# Patient Record
Sex: MALE | Race: WHITE | Employment: FULL TIME | URBAN - METROPOLITAN AREA
[De-identification: names, ages, dates, MRNs, and addresses within clinical notes are randomized per-mention and may not be internally consistent; named-entity substitution may affect disease eponyms.]

---

## 2017-07-25 ENCOUNTER — APPOINTMENT (OUTPATIENT)
Dept: GENERAL RADIOLOGY | Age: 57
End: 2017-07-25
Attending: EMERGENCY MEDICINE
Payer: MEDICAID

## 2017-07-25 ENCOUNTER — HOSPITAL ENCOUNTER (EMERGENCY)
Age: 57
Discharge: HOME | End: 2017-07-25
Attending: EMERGENCY MEDICINE
Payer: MEDICAID

## 2017-07-25 VITALS
WEIGHT: 165 LBS | TEMPERATURE: 97.6 F | SYSTOLIC BLOOD PRESSURE: 130 MMHG | DIASTOLIC BLOOD PRESSURE: 85 MMHG | HEIGHT: 68 IN | BODY MASS INDEX: 25.01 KG/M2 | RESPIRATION RATE: 18 BRPM | OXYGEN SATURATION: 96 % | HEART RATE: 69 BPM

## 2017-07-25 DIAGNOSIS — M79.672 INFLAMMATORY HEEL PAIN, LEFT: Primary | ICD-10-CM

## 2017-07-25 DIAGNOSIS — M72.2 PLANTAR FASCIITIS: ICD-10-CM

## 2017-07-25 PROCEDURE — 73630 X-RAY EXAM OF FOOT: CPT

## 2017-07-25 PROCEDURE — 99282 EMERGENCY DEPT VISIT SF MDM: CPT

## 2017-07-25 RX ORDER — ACETAMINOPHEN AND CODEINE PHOSPHATE 300; 30 MG/1; MG/1
1 TABLET ORAL
Qty: 15 TAB | Refills: 0 | Status: SHIPPED | OUTPATIENT
Start: 2017-07-25 | End: 2018-06-20

## 2017-07-25 RX ORDER — NAPROXEN 500 MG/1
500 TABLET ORAL 2 TIMES DAILY WITH MEALS
Qty: 20 TAB | Refills: 0 | Status: SHIPPED | OUTPATIENT
Start: 2017-07-25 | End: 2017-08-04

## 2017-07-25 NOTE — ED TRIAGE NOTES
Patient to ED with c/o left heal pain intermittently. Patient ambulatory on arrival. Rates pain 5/10. NKDA.

## 2017-07-25 NOTE — ED PROVIDER NOTES
HPI Comments: Dewayne Dias is a 56 y.o. Male who presents to the ED with c/o intermittent left foot pain for several months, worse today. He states his symptoms are worse with sitting down and wake him up from his sleep. Reports pain with ambulation. Reports a normal appetite. Claims his symptoms are improved with changing pair of shoes. Admits he has not visited a PCP because he does not have insurance. Admits to smoking and ETOH use. No other symptoms or concerns were expressed. The history is provided by the patient and a relative. No past medical history on file. No past surgical history on file. No family history on file. Social History     Social History    Marital status:      Spouse name: N/A    Number of children: N/A    Years of education: N/A     Occupational History    Not on file. Social History Main Topics    Smoking status: Current Every Day Smoker     Packs/day: 1.00    Smokeless tobacco: Not on file    Alcohol use Yes      Comment: Ocassionally    Drug use: No    Sexual activity: Yes     Partners: Female     Other Topics Concern    Not on file     Social History Narrative         ALLERGIES: Review of patient's allergies indicates no known allergies. Review of Systems   Constitutional: Negative. Negative for appetite change. HENT: Negative. Eyes: Negative. Respiratory: Negative. Cardiovascular: Negative. Gastrointestinal: Negative. Endocrine: Negative. Genitourinary: Negative. Musculoskeletal: Positive for myalgias. See HPI   Skin: Negative. Allergic/Immunologic: Negative. Neurological: Negative. Hematological: Negative. Psychiatric/Behavioral: Negative. All other systems reviewed and are negative.       Vitals:    07/25/17 1901   BP: 130/85   Pulse: 69   Resp: 18   Temp: 97.6 °F (36.4 °C)   SpO2: 96%   Weight: 74.8 kg (165 lb)   Height: 5' 8\" (1.727 m)            Physical Exam   Constitutional: He is oriented to person, place, and time. He appears well-developed and well-nourished. No distress. HENT:   Head: Normocephalic and atraumatic. Eyes: Conjunctivae and EOM are normal. Pupils are equal, round, and reactive to light. No scleral icterus. Neck: Normal range of motion. Neck supple. No JVD present. No thyromegaly present. Cardiovascular: Normal rate, regular rhythm, S1 normal and S2 normal.  Exam reveals no gallop and no friction rub. No murmur heard. Pulmonary/Chest: Effort normal and breath sounds normal. No accessory muscle usage. No respiratory distress. Abdominal: Soft. Normal appearance. He exhibits no distension. There is no tenderness. There is no rigidity, no rebound and no guarding. Musculoskeletal: He exhibits tenderness. He exhibits no edema. Pain on palpation of lt heel along with extension to mid foot without erythema without crepitance. Rt foot mild pain on heel and plantar mid foot. Neurological: He is alert and oriented to person, place, and time. He has normal strength. No cranial nerve deficit or sensory deficit. Coordination normal.   Distal pulses and sensations intact. Skin: Skin is warm and intact. No rash noted. Psychiatric: He has a normal mood and affect. His speech is normal and behavior is normal.   Vitals reviewed. MDM  Number of Diagnoses or Management Options  Inflammatory heel pain, left:   Plantar fasciitis:   Diagnosis management comments: 56 y.o. Male with hx of drinking comes in with several months of increased left heel pain. Pain is suggestive of either heel spur or plantar fascitis. No clinical evidence of ischemia. Xray does not show heel spur, will treat with steroids and mild opiates, visit later this week for reeval. Offered to check BG and renal functions. Pt refused blood draw, discussed value in doing that, continued to refuse.     ED Course       Procedures    Vitals:  Patient Vitals for the past 12 hrs:   Temp Pulse Resp BP SpO2   07/25/17 1901 97.6 °F (36.4 °C) 69 18 130/85 96 %     X-Ray, CT or other radiology findings or impressions:  XR FOOT LT MIN 3 V   Final Result      XR Foot LT:  No acute fracture or dislocation. Disposition:  Diagnosis:   1. Inflammatory heel pain, left    2. Plantar fasciitis        Disposition: Discharge. Follow-up Information     Follow up With Details Comments Contact Info    Wilder Bee DPM Schedule an appointment as soon as possible for a visit  [facility] [place] [facility] EMERGENCY DEPT  As needed, If symptoms worsen [address] [facility] [address]  [phone]           Patient's Medications   Start Taking    ACETAMINOPHEN-CODEINE (TYLENOL-CODEINE #3) 300-30 MG PER TABLET    Take 1 Tab by mouth every four (4) hours as needed for Pain for up to 15 doses. Max Daily Amount: 6 Tabs. NAPROXEN (NAPROSYN) 500 MG TABLET    Take 1 Tab by mouth two (2) times daily (with meals) for 10 days. Continue Taking    No medications on file   These Medications have changed    No medications on file   Stop Taking    No medications on file         Scribe Attestation      Leobardo Pang acting as a scribe for and in the presence of Jeanine Patterson MD      July 25, 2017 at 8:37 PM       Provider Attestation:      I personally performed the services described in the documentation, reviewed the documentation, as recorded by the scribe in my presence, and it accurately and completely records my words and actions.      Jeanine Patterson MD      Signed by: Brie Nguyễn, July 25, 2017 at 8:37 PM

## 2017-07-26 NOTE — DISCHARGE INSTRUCTIONS
Plantar Fasciitis: Care Instructions  Your Care Instructions    Plantar fasciitis is pain and inflammation of the plantar fascia, the tissue at the bottom of your foot that connects the heel bone to the toes. The plantar fascia also supports the arch. If you strain the plantar fascia, it can develop small tears and cause heel pain when you stand or walk. Plantar fasciitis can be caused by running or other sports. It also may occur in people who are overweight or who have high arches or flat feet. You may get plantar fasciitis if you walk or stand for long periods, or have a tight Achilles tendon or calf muscles. You can improve your foot pain with rest and other care at home. It might take a few weeks to a few months for your foot to heal completely. Follow-up care is a key part of your treatment and safety. Be sure to make and go to all appointments, and call your doctor if you are having problems. It's also a good idea to know your test results and keep a list of the medicines you take. How can you care for yourself at home? · Rest your feet often. Reduce your activity to a level that lets you avoid pain. If possible, do not run or walk on hard surfaces. · Take pain medicines exactly as directed. ¨ If the doctor gave you a prescription medicine for pain, take it as prescribed. ¨ If you are not taking a prescription pain medicine, take an over-the-counter anti-inflammatory medicine for pain and swelling, such as ibuprofen (Advil, Motrin) or naproxen (Aleve). Read and follow all instructions on the label. · Use ice massage to help with pain and swelling. You can use an ice cube or an ice cup several times a day. To make an ice cup, fill a paper cup with water and freeze it. Cut off the top of the cup until a half-inch of ice shows. Hold onto the remaining paper to use the cup. Rub the ice in small circles over the area for 5 to 7 minutes.   · Contrast baths, which alternate hot and cold water, can also help reduce swelling. But because heat alone may make pain and swelling worse, end a contrast bath with a soak in cold water. · Wear a night splint if your doctor suggests it. A night splint holds your foot with the toes pointed up and the foot and ankle at a 90-degree angle. This position gives the bottom of your foot a constant, gentle stretch. · Do simple exercises such as calf stretches and towel stretches 2 to 3 times each day, especially when you first get up in the morning. These can help the plantar fascia become more flexible. They also make the muscles that support your arch stronger. Hold these stretches for 15 to 30 seconds per stretch. Repeat 2 to 4 times. ¨ Stand about 1 foot from a wall. Place the palms of both hands against the wall at chest level. Lean forward against the wall, keeping one leg with the knee straight and heel on the ground while bending the knee of the other leg. ¨ Sit down on the floor or a mat with your feet stretched in front of you. Roll up a towel lengthwise, and loop it over the ball of your foot. Holding the towel at both ends, gently pull the towel toward you to stretch your foot. · Wear shoes with good arch support. Athletic shoes or shoes with a well-cushioned sole are good choices. · Try heel cups or shoe inserts (orthotics) to help cushion your heel. You can buy these at many shoe stores. · Put on your shoes as soon as you get out of bed. Going barefoot or wearing slippers may make your pain worse. · Reach and stay at a good weight for your height. This puts less strain on your feet. When should you call for help? Call your doctor now or seek immediate medical care if:  · You have heel pain with fever, redness, or warmth in your heel. · You cannot put weight on the sore foot. Watch closely for changes in your health, and be sure to contact your doctor if:  · You have numbness or tingling in your heel. · Your heel pain lasts more than 2 weeks.   Where can you learn more? Go to http://mela-lori.info/. Dasia Hernandez in the search box to learn more about \"Plantar Fasciitis: Care Instructions. \"  Current as of: March 21, 2017  Content Version: 11.3  © 0775-0569 [employer]. Care instructions adapted under license by "[employer] (which disclaims liability or warranty for this information). If you have questions about a medical condition or this instruction, always ask your healthcare professional. [address] any warranty or liability for your use of this information. Heel Pain: Care Instructions  Your Care Instructions  You can have heel pain from an injury or from everyday overuse, such as running or walking a lot. Plantar fasciitis is the most common cause of heel pain. In this condition, the bottom of your foot from the front of the heel to the base of the toes is sore and hard to walk on. Your heel can get better with rest, anti-inflammatory pain medicines, and stretching exercises. Follow-up care is a key part of your treatment and safety. Be sure to make and go to all appointments, and call your doctor if you are having problems. Its also a good idea to know your test results and keep a list of the medicines you take. How can you care for yourself at home? · Rest your feet often. Reduce your activity to a level that lets you avoid pain. If possible, do not run or walk on hard surfaces. · Take anti-inflammatory medicines to reduce heel pain. These include ibuprofen (Advil, Motrin) and naproxen (Aleve). Read and follow all instructions on the label. · Put ice or a cold pack on your heel for 10 to 20 minutes at a time. Try to do this every 1 to 2 hours for the next 3 days (when you are awake). Put a thin cloth between the ice and your skin. · If ice isn't helping after 2 or 3 days, try heat, such as a heating pad set on low. · If your doctor says it is okay, try these calf stretches.  Tight calf muscles can cause heel pain or make it worse. ¨ Stand about 1 foot from a wall. Place the palms of both hands against the wall at chest level and lean forward against the wall. Put the leg you want to stretch about a step behind your other leg. Keep your back heel on the floor and bend your front knee until you feel a stretch in the back leg. Hold this position for 15 to 30 seconds. Repeat the exercise 2 to 4 times a session. Do 3 to 4 sessions a day. ¨ Sit down on the floor or a mat with your feet stretched in front of you. Roll up a towel lengthwise, and loop it over the ball of your foot. Holding the towel at both ends, gently pull the towel toward you to stretch your foot. Hold this position for 15 to 30 seconds. Repeat the exercise 2 to 4 times a session. Do 3 to 4 sessions a day. · Wear a night splint if your doctor suggests it. A night splint holds your foot with the toes pointed up. This position gives the bottom of your foot a constant, gentle stretch. · Wear shoes with good arch support. Athletic shoes or shoes with a well-cushioned sole are good choices. · Try a heel lift, heel cup or shoe insert (orthotic) to help cushion your heel. You can buy these at many shoe stores. Use them in both shoes, even if only one foot hurts. · Maintain a healthy weight. This puts less strain on your feet. When should you call for help? Call your doctor now or seek immediate medical care if:  · You have heel pain with fever, redness, or warmth in your heel. · You have numbness or tingling in your heel. Watch closely for changes in your health, and be sure to contact your doctor if:  · You cannot put weight on the sore foot. · Your heel pain lasts more than 2 weeks. Where can you learn more? Go to http://mela-lori.info/. Enter S299 in the search box to learn more about \"Heel Pain: Care Instructions. \"  Current as of: March 20, 2017  Content Version: 11.3  © 5994-2469 [employer]. Care instructions adapted under license by [employer] (which disclaims liability or warranty for this information). If you have questions about a medical condition or this instruction, always ask your healthcare professional. [address] any warranty or liability for your use of this information.

## 2018-05-04 ENCOUNTER — APPOINTMENT (OUTPATIENT)
Dept: GENERAL RADIOLOGY | Age: 58
End: 2018-05-04
Attending: PHYSICIAN ASSISTANT
Payer: MEDICAID

## 2018-05-04 ENCOUNTER — HOSPITAL ENCOUNTER (EMERGENCY)
Age: 58
Discharge: HOME | End: 2018-05-04
Attending: EMERGENCY MEDICINE
Payer: MEDICAID

## 2018-05-04 VITALS
WEIGHT: 175 LBS | TEMPERATURE: 100.4 F | OXYGEN SATURATION: 96 % | RESPIRATION RATE: 16 BRPM | DIASTOLIC BLOOD PRESSURE: 81 MMHG | HEIGHT: 68 IN | SYSTOLIC BLOOD PRESSURE: 122 MMHG | BODY MASS INDEX: 26.52 KG/M2 | HEART RATE: 78 BPM

## 2018-05-04 DIAGNOSIS — L03.114 CELLULITIS OF LEFT UPPER EXTREMITY: Primary | ICD-10-CM

## 2018-05-04 LAB
ALBUMIN SERPL-MCNC: 3.4 G/DL (ref 3.4–5)
ALBUMIN/GLOB SERPL: 1 {RATIO} (ref 0.8–1.7)
ALP SERPL-CCNC: 60 U/L (ref 45–117)
ALT SERPL-CCNC: 18 U/L (ref 16–61)
ANION GAP SERPL CALC-SCNC: 6 MMOL/L (ref 3–18)
AST SERPL-CCNC: 13 U/L (ref 15–37)
BASOPHILS # BLD: 0 K/UL (ref 0–0.06)
BASOPHILS NFR BLD: 0 % (ref 0–2)
BILIRUB SERPL-MCNC: 1.2 MG/DL (ref 0.2–1)
BUN SERPL-MCNC: 14 MG/DL (ref 7–18)
BUN/CREAT SERPL: 23 (ref 12–20)
CALCIUM SERPL-MCNC: 7.8 MG/DL (ref 8.5–10.1)
CHLORIDE SERPL-SCNC: 104 MMOL/L (ref 100–108)
CO2 SERPL-SCNC: 25 MMOL/L (ref 21–32)
CREAT SERPL-MCNC: 0.61 MG/DL (ref 0.6–1.3)
DIFFERENTIAL METHOD BLD: ABNORMAL
EOSINOPHIL # BLD: 0.1 K/UL (ref 0–0.4)
EOSINOPHIL NFR BLD: 1 % (ref 0–5)
ERYTHROCYTE [DISTWIDTH] IN BLOOD BY AUTOMATED COUNT: 14 % (ref 11.6–14.5)
GLOBULIN SER CALC-MCNC: 3.5 G/DL (ref 2–4)
GLUCOSE SERPL-MCNC: 95 MG/DL (ref 74–99)
HCT VFR BLD AUTO: 37.1 % (ref 36–48)
HGB BLD-MCNC: 13.1 G/DL (ref 13–16)
LYMPHOCYTES # BLD: 1.1 K/UL (ref 0.9–3.6)
LYMPHOCYTES NFR BLD: 7 % (ref 21–52)
MCH RBC QN AUTO: 30 PG (ref 24–34)
MCHC RBC AUTO-ENTMCNC: 35.3 G/DL (ref 31–37)
MCV RBC AUTO: 84.9 FL (ref 74–97)
MONOCYTES # BLD: 1.4 K/UL (ref 0.05–1.2)
MONOCYTES NFR BLD: 9 % (ref 3–10)
NEUTS SEG # BLD: 12.1 K/UL (ref 1.8–8)
NEUTS SEG NFR BLD: 83 % (ref 40–73)
PLATELET # BLD AUTO: 203 K/UL (ref 135–420)
PMV BLD AUTO: 9.5 FL (ref 9.2–11.8)
POTASSIUM SERPL-SCNC: 3.9 MMOL/L (ref 3.5–5.5)
PROT SERPL-MCNC: 6.9 G/DL (ref 6.4–8.2)
RBC # BLD AUTO: 4.37 M/UL (ref 4.7–5.5)
SODIUM SERPL-SCNC: 135 MMOL/L (ref 136–145)
WBC # BLD AUTO: 14.7 K/UL (ref 4.6–13.2)

## 2018-05-04 PROCEDURE — 80053 COMPREHEN METABOLIC PANEL: CPT | Performed by: PHYSICIAN ASSISTANT

## 2018-05-04 PROCEDURE — 74011250637 HC RX REV CODE- 250/637: Performed by: PHYSICIAN ASSISTANT

## 2018-05-04 PROCEDURE — 99283 EMERGENCY DEPT VISIT LOW MDM: CPT

## 2018-05-04 PROCEDURE — 85025 COMPLETE CBC W/AUTO DIFF WBC: CPT | Performed by: PHYSICIAN ASSISTANT

## 2018-05-04 PROCEDURE — 73080 X-RAY EXAM OF ELBOW: CPT

## 2018-05-04 RX ORDER — HYDROCODONE BITARTRATE AND ACETAMINOPHEN 10; 325 MG/1; MG/1
1 TABLET ORAL
Qty: 20 TAB | Refills: 0 | Status: SHIPPED | OUTPATIENT
Start: 2018-05-04 | End: 2018-06-20

## 2018-05-04 RX ORDER — HYDROCODONE BITARTRATE AND ACETAMINOPHEN 5; 325 MG/1; MG/1
1 TABLET ORAL
Status: COMPLETED | OUTPATIENT
Start: 2018-05-04 | End: 2018-05-04

## 2018-05-04 RX ORDER — CEPHALEXIN 500 MG/1
500 CAPSULE ORAL 4 TIMES DAILY
Qty: 28 CAP | Refills: 0 | Status: SHIPPED | OUTPATIENT
Start: 2018-05-04 | End: 2018-05-11

## 2018-05-04 RX ORDER — SULFAMETHOXAZOLE AND TRIMETHOPRIM 800; 160 MG/1; MG/1
1 TABLET ORAL 2 TIMES DAILY
Qty: 14 TAB | Refills: 0 | Status: SHIPPED | OUTPATIENT
Start: 2018-05-04 | End: 2018-05-11

## 2018-05-04 RX ADMIN — HYDROCODONE BITARTRATE AND ACETAMINOPHEN 1 TABLET: 5; 325 TABLET ORAL at 14:14

## 2018-05-04 NOTE — ED PROVIDER NOTES
EMERGENCY DEPARTMENT HISTORY AND PHYSICAL EXAM    Date: 5/4/2018  Patient Name: Dewayne Dias    History of Presenting Illness     Chief Complaint   Patient presents with    Arm Pain    Arm swelling         History Provided By: Patient    Chief Complaint: Elbow pain and swelling   Duration: 3 days   Timing:  Acute  Location: Left elbow   Quality: Aching  Severity: Moderate  Modifying Factors: worse when trying to sleep, nothing makes it better  Associated Symptoms: none       Additional History (Context): Dewayne Dias is a 66 y.o. male with no medical history who presents with a 3 day history of elbow pain and swelling. He reports he was under his sisters house and thinks he may have gotten bite by a spider. He has tried norco at home for pain without relief. Reports redness and swelling to area. States tetanus is up to date. Pt denies any fevers or chills, headache, dizziness or light headedness, ENT issues, CP or discomfort, SOB, cough, n/v/d/c, abd pain, back pain, diaphoresis, melena/hematochezia, dysuria, hematuria, frequency, focal weakness/numbness/tingling. Patient has no other complaints at this time. PCP: None    Current Facility-Administered Medications   Medication Dose Route Frequency Provider Last Rate Last Dose    HYDROcodone-acetaminophen (NORCO) 5-325 mg per tablet 1 Tab  1 Tab Oral NOW STEPH Lawrence         Current Outpatient Prescriptions   Medication Sig Dispense Refill    cephALEXin (KEFLEX) 500 mg capsule Take 1 Cap by mouth four (4) times daily for 7 days. 28 Cap 0    HYDROcodone-acetaminophen (NORCO)  mg tablet Take 1 Tab by mouth every six (6) hours as needed for Pain. Max Daily Amount: 4 Tabs. 20 Tab 0    trimethoprim-sulfamethoxazole (BACTRIM DS) 160-800 mg per tablet Take 1 Tab by mouth two (2) times a day for 7 days.  14 Tab 0    acetaminophen-codeine (TYLENOL-CODEINE #3) 300-30 mg per tablet Take 1 Tab by mouth every four (4) hours as needed for Pain for up to 15 doses. Max Daily Amount: 6 Tabs. 15 Tab 0       Past History     Past Medical History:  No past medical history on file. Past Surgical History:  No past surgical history on file. Family History:  No family history on file. Social History:  Social History   Substance Use Topics    Smoking status: Current Every Day Smoker     Packs/day: 1.00    Smokeless tobacco: Not on file    Alcohol use Yes      Comment: Ocassionally       Allergies:  No Known Allergies      Review of Systems   Review of Systems   Constitutional: Negative for chills and fever. HENT: Negative for congestion, rhinorrhea and sore throat. Respiratory: Negative for cough and shortness of breath. Cardiovascular: Negative for chest pain. Gastrointestinal: Negative for abdominal pain, blood in stool, constipation, diarrhea, nausea and vomiting. Genitourinary: Negative for dysuria, frequency and hematuria. Musculoskeletal: Negative for back pain and myalgias. Skin: Positive for color change and wound. Negative for rash. Neurological: Negative for dizziness and headaches. All other systems reviewed and are negative. All Other Systems Negative  Physical Exam     Vitals:    05/04/18 1242   BP: 122/81   Pulse: 78   Resp: 16   Temp: 100.4 °F (38 °C)   SpO2: 96%   Weight: 79.4 kg (175 lb)   Height: 5' 8\" (1.727 m)     Physical Exam   Constitutional: He is oriented to person, place, and time. He appears well-developed and well-nourished. No distress. HENT:   Head: Normocephalic and atraumatic. Eyes: Conjunctivae are normal.   Neck: Normal range of motion. Neck supple. Cardiovascular: Normal rate, regular rhythm and normal heart sounds. Pulmonary/Chest: Effort normal and breath sounds normal. No respiratory distress. He exhibits no tenderness. Abdominal: Soft. Bowel sounds are normal. He exhibits no distension. There is no tenderness. There is no rebound and no guarding. Musculoskeletal: Normal range of motion.  He exhibits no edema or deformity. Neurological: He is alert and oriented to person, place, and time. Skin: Skin is warm and dry. Lesion noted. He is not diaphoretic. Erythema and overlying cellulitis noted on the left elbow extending up the arm and below, no obvious fluctuance or abscess noted. No induration. Psychiatric: He has a normal mood and affect. His behavior is normal.   Nursing note and vitals reviewed. Diagnostic Study Results     Labs -     Recent Results (from the past 12 hour(s))   CBC WITH AUTOMATED DIFF    Collection Time: 05/04/18  1:15 PM   Result Value Ref Range    WBC 14.7 (H) 4.6 - 13.2 K/uL    RBC 4.37 (L) 4.70 - 5.50 M/uL    HGB 13.1 13.0 - 16.0 g/dL    HCT 37.1 36.0 - 48.0 %    MCV 84.9 74.0 - 97.0 FL    MCH 30.0 24.0 - 34.0 PG    MCHC 35.3 31.0 - 37.0 g/dL    RDW 14.0 11.6 - 14.5 %    PLATELET 290 409 - 456 K/uL    MPV 9.5 9.2 - 11.8 FL    NEUTROPHILS 83 (H) 40 - 73 %    LYMPHOCYTES 7 (L) 21 - 52 %    MONOCYTES 9 3 - 10 %    EOSINOPHILS 1 0 - 5 %    BASOPHILS 0 0 - 2 %    ABS. NEUTROPHILS 12.1 (H) 1.8 - 8.0 K/UL    ABS. LYMPHOCYTES 1.1 0.9 - 3.6 K/UL    ABS. MONOCYTES 1.4 (H) 0.05 - 1.2 K/UL    ABS. EOSINOPHILS 0.1 0.0 - 0.4 K/UL    ABS. BASOPHILS 0.0 0.0 - 0.06 K/UL    DF AUTOMATED     METABOLIC PANEL, COMPREHENSIVE    Collection Time: 05/04/18  1:15 PM   Result Value Ref Range    Sodium 135 (L) 136 - 145 mmol/L    Potassium 3.9 3.5 - 5.5 mmol/L    Chloride 104 100 - 108 mmol/L    CO2 25 21 - 32 mmol/L    Anion gap 6 3.0 - 18 mmol/L    Glucose 95 74 - 99 mg/dL    BUN 14 7.0 - 18 MG/DL    Creatinine 0.61 0.6 - 1.3 MG/DL    BUN/Creatinine ratio 23 (H) 12 - 20      GFR est AA >60 >60 ml/min/1.73m2    GFR est non-AA >60 >60 ml/min/1.73m2    Calcium 7.8 (L) 8.5 - 10.1 MG/DL    Bilirubin, total 1.2 (H) 0.2 - 1.0 MG/DL    ALT (SGPT) 18 16 - 61 U/L    AST (SGOT) 13 (L) 15 - 37 U/L    Alk.  phosphatase 60 45 - 117 U/L    Protein, total 6.9 6.4 - 8.2 g/dL    Albumin 3.4 3.4 - 5.0 g/dL    Globulin 3.5 2.0 - 4.0 g/dL    A-G Ratio 1.0 0.8 - 1.7         Radiologic Studies -   XR ELBOW LT MIN 3 V   Final Result        CT Results  (Last 48 hours)    None        CXR Results  (Last 48 hours)    None            Medical Decision Making   I am the first provider for this patient. I reviewed the vital signs, available nursing notes, past medical history, past surgical history, family history and social history. Vital Signs-Reviewed the patient's vital signs. Pulse Oximetry Analysis -  100 % RA    Records Reviewed: Nursing Notes and Old Medical Records    Procedures:  Procedures none     Provider Notes (Medical Decision Making):     Differential: cellulitis, abscess, osteomyelitis, sepsis     Plan: Will order basic labs, pain meds, and xray. 2:14 PM  Patient insist who will not and can not be admitted for this even if necessary. Does not meet SIRS criteria. WBC elevated. No abscess to drain at this time. Discussed plan with Dr. Anabel Franklin who agrees with trial of outpatient abx. I have had a long conversation with patient about the need to return in 2 days for wound check. Have discussed the outpatient trail of abx and if not improved patient must return, he agreed. MED RECONCILIATION:  Current Facility-Administered Medications   Medication Dose Route Frequency    HYDROcodone-acetaminophen (NORCO) 5-325 mg per tablet 1 Tab  1 Tab Oral NOW     Current Outpatient Prescriptions   Medication Sig    cephALEXin (KEFLEX) 500 mg capsule Take 1 Cap by mouth four (4) times daily for 7 days.  HYDROcodone-acetaminophen (NORCO)  mg tablet Take 1 Tab by mouth every six (6) hours as needed for Pain. Max Daily Amount: 4 Tabs.  trimethoprim-sulfamethoxazole (BACTRIM DS) 160-800 mg per tablet Take 1 Tab by mouth two (2) times a day for 7 days.  acetaminophen-codeine (TYLENOL-CODEINE #3) 300-30 mg per tablet Take 1 Tab by mouth every four (4) hours as needed for Pain for up to 15 doses. Max Daily Amount: 6 Tabs. Disposition:  Home     DISCHARGE NOTE:   Pt has been reexamined. Patient has no new complaints, changes, or physical findings. Care plan outlined and precautions discussed. Results of workup were reviewed with the patient. All medications were reviewed with the patient; will d/c home with pain meds and abx. All of pt's questions and concerns were addressed. Patient was instructed and agrees to return in 2 days for wound check, as well as to return to the ED upon further deterioration. Patient is ready to go home. Follow-up Information     Follow up With Details Comments Contact Info    [facility] EMERGENCY DEPT In 3 days For wound re-check [address] [address]  [address]  As needed [address] [address]  [phone]          Current Discharge Medication List      START taking these medications    Details   cephALEXin (KEFLEX) 500 mg capsule Take 1 Cap by mouth four (4) times daily for 7 days. Qty: 28 Cap, Refills: 0    Associated Diagnoses: Cellulitis of left upper extremity      HYDROcodone-acetaminophen (NORCO)  mg tablet Take 1 Tab by mouth every six (6) hours as needed for Pain. Max Daily Amount: 4 Tabs. Qty: 20 Tab, Refills: 0    Associated Diagnoses: Cellulitis of left upper extremity      trimethoprim-sulfamethoxazole (BACTRIM DS) 160-800 mg per tablet Take 1 Tab by mouth two (2) times a day for 7 days. Qty: 14 Tab, Refills: 0    Associated Diagnoses: Cellulitis of left upper extremity             Diagnosis     Clinical Impression:   1.  Cellulitis of left upper extremity

## 2018-05-04 NOTE — ED TRIAGE NOTES
I performed a brief evaluation, including history and physical, of the patient here in triage and I have determined that pt will need further treatment and evaluation from the main side ER physician. I have placed initial orders to help in expediting patients care.      May 04, 2018 at 12:45 PM - Jean Paul Wilkinson PA-C        Visit Vitals    /81    Pulse 78    Temp 100.4 °F (38 °C)    Resp 16    Ht 5' 8\" (1.727 m)    Wt 79.4 kg (175 lb)    SpO2 96%    BMI 26.61 kg/m2

## 2018-05-04 NOTE — LETTER
[address] [place] [address] Dr [facility] EMERGENCY DEPT 
[address] [facility] [id number] [phone] Work/School Note Date: 5/4/2018 To Whom It May concern: 
 
Dewayne Dias was seen and treated today in the emergency room by the following provider(s): 
Attending Provider: Vania Capps MD 
Physician Assistant: Jacob Hammond, [address] [facility],[address] may return to work on 5/5/18 Sincerely, Jacob Hammond, [address]

## 2018-05-04 NOTE — DISCHARGE INSTRUCTIONS
Cellulitis: Care Instructions  Your Care Instructions    Cellulitis is a skin infection. It often occurs after a break in the skin from a scrape, cut, bite, or puncture, or after a rash. The doctor has checked you carefully, but problems can develop later. If you notice any problems or new symptoms, get medical treatment right away. Follow-up care is a key part of your treatment and safety. Be sure to make and go to all appointments, and call your doctor if you are having problems. It's also a good idea to know your test results and keep a list of the medicines you take. How can you care for yourself at home? · Take your antibiotics as directed. Do not stop taking them just because you feel better. You need to take the full course of antibiotics. · Prop up the infected area on pillows to reduce pain and swelling. Try to keep the area above the level of your heart as often as you can. · If your doctor told you how to care for your wound, follow your doctor's instructions. If you did not get instructions, follow this general advice:  ¨ Wash the wound with clean water 2 times a day. Don't use hydrogen peroxide or alcohol, which can slow healing. ¨ You may cover the wound with a thin layer of petroleum jelly, such as Vaseline, and a nonstick bandage. ¨ Apply more petroleum jelly and replace the bandage as needed. · Be safe with medicines. Take pain medicines exactly as directed. ¨ If the doctor gave you a prescription medicine for pain, take it as prescribed. ¨ If you are not taking a prescription pain medicine, ask your doctor if you can take an over-the-counter medicine. To prevent cellulitis in the future  · Try to prevent cuts, scrapes, or other injuries to your skin. Cellulitis most often occurs where there is a break in the skin. · If you get a scrape, cut, mild burn, or bite, wash the wound with clean water as soon as you can to help avoid infection.  Don't use hydrogen peroxide or alcohol, which can slow healing. · If you have swelling in your legs (edema), support stockings and good skin care may help prevent leg sores and cellulitis. · Take care of your feet, especially if you have diabetes or other conditions that increase the risk of infection. Wear shoes and socks. Do not go barefoot. If you have athlete's foot or other skin problems on your feet, talk to your doctor about how to treat them. When should you call for help? Call your doctor now or seek immediate medical care if:  ? · You have signs that your infection is getting worse, such as:  ¨ Increased pain, swelling, warmth, or redness. ¨ Red streaks leading from the area. ¨ Pus draining from the area. ¨ A fever. ? · You get a rash. ? Watch closely for changes in your health, and be sure to contact your doctor if:  ? · You are not getting better after 1 day (24 hours). ? · You do not get better as expected. Where can you learn more? Go to http://mela-lori.info/. Kenneth Yee in the search box to learn more about \"Cellulitis: Care Instructions. \"  Current as of: October 13, 2016  Content Version: 11.4  © 9274-4644 [employer]. Care instructions adapted under license by [employer] (which disclaims liability or warranty for this information). If you have questions about a medical condition or this instruction, always ask your healthcare professional. [place] [address] any warranty or liability for your use of this information.

## 2018-06-20 ENCOUNTER — HOSPITAL ENCOUNTER (EMERGENCY)
Age: 58
Discharge: HOME | End: 2018-06-20
Attending: EMERGENCY MEDICINE
Payer: MEDICAID

## 2018-06-20 ENCOUNTER — APPOINTMENT (OUTPATIENT)
Dept: GENERAL RADIOLOGY | Age: 58
End: 2018-06-20
Attending: PHYSICIAN ASSISTANT
Payer: MEDICAID

## 2018-06-20 VITALS
WEIGHT: 175 LBS | DIASTOLIC BLOOD PRESSURE: 75 MMHG | OXYGEN SATURATION: 97 % | RESPIRATION RATE: 16 BRPM | HEIGHT: 68 IN | HEART RATE: 65 BPM | SYSTOLIC BLOOD PRESSURE: 121 MMHG | BODY MASS INDEX: 26.52 KG/M2 | TEMPERATURE: 97.3 F

## 2018-06-20 DIAGNOSIS — M25.522 LEFT ELBOW PAIN: ICD-10-CM

## 2018-06-20 DIAGNOSIS — M70.32 BURSITIS OF LEFT ELBOW, UNSPECIFIED BURSA: Primary | ICD-10-CM

## 2018-06-20 PROCEDURE — 73080 X-RAY EXAM OF ELBOW: CPT

## 2018-06-20 PROCEDURE — 74011250637 HC RX REV CODE- 250/637: Performed by: PHYSICIAN ASSISTANT

## 2018-06-20 PROCEDURE — 99283 EMERGENCY DEPT VISIT LOW MDM: CPT

## 2018-06-20 RX ORDER — HYDROCODONE BITARTRATE AND ACETAMINOPHEN 5; 325 MG/1; MG/1
1 TABLET ORAL
Status: COMPLETED | OUTPATIENT
Start: 2018-06-20 | End: 2018-06-20

## 2018-06-20 RX ORDER — HYDROCODONE BITARTRATE AND ACETAMINOPHEN 5; 325 MG/1; MG/1
1 TABLET ORAL
Qty: 12 TAB | Refills: 0 | Status: SHIPPED | OUTPATIENT
Start: 2018-06-20 | End: 2021-04-20

## 2018-06-20 RX ORDER — METHYLPREDNISOLONE 4 MG/1
TABLET ORAL
Qty: 1 DOSE PACK | Refills: 0 | Status: SHIPPED | OUTPATIENT
Start: 2018-06-20 | End: 2021-04-20

## 2018-06-20 RX ADMIN — HYDROCODONE BITARTRATE AND ACETAMINOPHEN 1 TABLET: 5; 325 TABLET ORAL at 11:42

## 2018-06-20 NOTE — ED PROVIDER NOTES
EMERGENCY DEPARTMENT HISTORY AND PHYSICAL EXAM    Date: 6/20/2018  Patient Name: Juliana Wray    History of Presenting Illness     Chief Complaint   Patient presents with    Elbow Pain    Arm Pain         History Provided By: Patient    Chief Complaint: elbow pain  Duration: 1-2 Weeks  Timing:  Acute and Constant  Location: left elbow  Quality: Sharp  Severity: 6 out of 10  Modifying Factors: had a history of elbow cellulitis one month ago, did not completely take antibiotics. Elbow did feel better after short course. Admits to hitting the elbow often in his line of work  Associated Symptoms: no fevers, chills, increased pain with movement, mild swelling, no redness, no hot joint      Additional History (Context): Juliana Wray is a 66 y.o. male with No significant past medical history who presents with C/O left elbow pain for the past 1-2 weeks with noted swelling. States that it hurts every time he tries to extend the elbow. Admits to hitting his elbow often while at work, but denies any kath specific injury. Does report a history of cellulitis to the elbow about one month ago; he did take his ABx, but not completely. The elbow felt better but then began to hurt again 1-2 weeks ago. Denies redness, fevers, chills, inability to move the elbow. Denies IVDA. PCP: None    Current Outpatient Prescriptions   Medication Sig Dispense Refill    methylPREDNISolone (MEDROL, ILIA,) 4 mg tablet Take as directed 1 Dose Pack 0    HYDROcodone-acetaminophen (NORCO) 5-325 mg per tablet Take 1 Tab by mouth every four (4) hours as needed for Pain. Max Daily Amount: 6 Tabs. 12 Tab 0       Past History     Past Medical History:  History reviewed. No pertinent past medical history. Past Surgical History:  History reviewed. No pertinent surgical history. Family History:  History reviewed. No pertinent family history.     Social History:  Social History   Substance Use Topics    Smoking status: Current Every Day Smoker Packs/day: 1.00    Smokeless tobacco: None    Alcohol use Yes      Comment: Ocassionally       Allergies:  No Known Allergies      Review of Systems   Review of Systems   Constitutional: Negative for chills and fever. Respiratory: Negative for shortness of breath and wheezing. Cardiovascular: Negative for chest pain and palpitations. Musculoskeletal: Positive for arthralgias. Left elbow pain   Skin: Negative for color change, rash and wound. All other systems reviewed and are negative. Physical Exam     Vitals:    06/20/18 1012   BP: 121/75   Pulse: 65   Resp: 16   Temp: 97.3 °F (36.3 °C)   SpO2: 97%   Weight: 79.4 kg (175 lb)   Height: 5' 8\" (1.727 m)     Physical Exam   Constitutional: He is oriented to person, place, and time. He appears well-developed and well-nourished. No distress. HENT:   Head: Normocephalic and atraumatic. Eyes: EOM are normal. Pupils are equal, round, and reactive to light. Neck: Neck supple. Cardiovascular: Normal rate and regular rhythm. Exam reveals no gallop and no friction rub. No murmur heard. Pulmonary/Chest: Effort normal and breath sounds normal. No respiratory distress. He has no wheezes. He has no rales. Musculoskeletal:   + TTP over the left elbow over the olecranon. Mild joint effusion to suggest bursitis. No erythema, no warmth, no drainage or skin changes. Patient 5/5 strength in left elbow flexion against resistance but has 3/5 strength in extension of the  Left elbow because it hurts to extend. Radial pulses intact and equal.  Cap refill is less than 2 sec. Lymphadenopathy:     He has no cervical adenopathy. Neurological: He is alert and oriented to person, place, and time. Skin: Skin is warm and dry. No rash noted. He is not diaphoretic. Psychiatric: He has a normal mood and affect. His behavior is normal.   Nursing note and vitals reviewed.        Diagnostic Study Results     Labs -   No results found for this or any previous visit (from the past 12 hour(s)). Radiologic Studies -   XR ELBOW LT MIN 3 V    (Results Pending)     CT Results  (Last 48 hours)    None        CXR Results  (Last 48 hours)    None        XR elbow prelim by me:  No acute fracture visualized    Medical Decision Making   I am the first provider for this patient. I reviewed the vital signs, available nursing notes, past medical history, past surgical history, family history and social history. Vital Signs-Reviewed the patient's vital signs. Records Reviewed: Nursing Notes    Procedures:  Procedures    Provider Notes (Medical Decision Making): Impression:   Left elbow bursitis. Will ace wrap, send to ortho, write for small amt of Norco and Medol dose ilia. STEPH Guthrie    MED RECONCILIATION:  No current facility-administered medications for this encounter. Current Outpatient Prescriptions   Medication Sig    methylPREDNISolone (MEDROL, ILIA,) 4 mg tablet Take as directed    HYDROcodone-acetaminophen (NORCO) 5-325 mg per tablet Take 1 Tab by mouth every four (4) hours as needed for Pain. Max Daily Amount: 6 Tabs. Disposition:  Discharged    DISCHARGE NOTE:     Pt has been reexamined. Patient has no new complaints, changes, or physical findings. Care plan outlined and precautions discussed. Results of XR were reviewed with the patient. All medications were reviewed with the patient; will d/c home with norco and medrol. All of pt's questions and concerns were addressed. Patient was instructed and agrees to follow up with ortho, as well as to return to the ED upon further deterioration. Patient is ready to go home. Follow-up Information     Follow up With Details Comments Contact Info    [facility] EMERGENCY DEPT  If symptoms worsen [address] [address]  [address], [place].  In 1 week  Katherine [address] [place] [address]  [phone]          Current Discharge Medication List      START taking these medications    Details   methylPREDNISolone (MEDROL, ILIA,) 4 mg tablet Take as directed  Qty: 1 Dose Pack, Refills: 0      HYDROcodone-acetaminophen (NORCO) 5-325 mg per tablet Take 1 Tab by mouth every four (4) hours as needed for Pain. Max Daily Amount: 6 Tabs. Qty: 12 Tab, Refills: 0    Associated Diagnoses: Bursitis of left elbow, unspecified bursa; Left elbow pain         STOP taking these medications       HYDROcodone-acetaminophen (NORCO)  mg tablet Comments:   Reason for Stopping:         acetaminophen-codeine (TYLENOL-CODEINE #3) 300-30 mg per tablet Comments:   Reason for Stopping:               Diagnosis     Clinical Impression:   1. Bursitis of left elbow, unspecified bursa    2.  Left elbow pain

## 2018-06-20 NOTE — LETTER
[facility] 
[facility] EMERGENCY DEPT 
[address] [facility] [address] 
[phone] Work/School Note Date: 6/20/2018 To Whom It May concern: 
 
Dewayne Dias was seen and treated today in the emergency room by the following provider(s): 
Attending Provider: Sarah Salinas MD 
Physician Assistant: Eric Ma, [address] [place] [address],[address] may return to work 6/22/18. Sincerely, Abigail Ballesteros

## 2018-06-20 NOTE — ED TRIAGE NOTES
Patient states that he was here about a month ago for a spider bite and he was feeling fine, now he is complaining of elbow and arm pain.

## 2018-06-20 NOTE — DISCHARGE INSTRUCTIONS
Bursitis: Care Instructions  Your Care Instructions    A bursa is a small sac of fluid that helps the tissues around a joint slide over one another easily. Injury or overuse of a joint can cause pain, redness, and inflammation in the bursa (bursitis). Bursitis usually gets better if you avoid the activity that caused it. You can help prevent bursitis from coming back by doing stretching and strengthening exercises. You may also need to change the way you do some activities. Follow-up care is a key part of your treatment and safety. Be sure to make and go to all appointments, and call your doctor if you are having problems. It's also a good idea to know your test results and keep a list of the medicines you take. How can you care for yourself at home? · Put ice or a cold pack on the area for 10 to 20 minutes at a time. Try to do this every 1 to 2 hours for the next 3 days (when you are awake) or until the swelling goes down. Put a thin cloth between the ice and your skin. · After the 3 days of using ice, you may use heat on the area. You can use a hot water bottle; a warm, moist towel; or a heating pad set on low. You can also try alternating heat and ice. · Rest the area where you have pain. Stop any activities that cause pain. Switch to activities that do not stress the area. · Take pain medicines exactly as directed. ¨ If the doctor gave you a prescription medicine for pain, take it as prescribed. ¨ If you are not taking a prescription pain medicine, ask your doctor if you can take an over-the-counter medicine. ¨ Do not take two or more pain medicines at the same time unless the doctor told you to. Many pain medicines have acetaminophen, which is Tylenol. Too much acetaminophen (Tylenol) can be harmful. · To prevent stiffness, gently move the joint as much as you can without pain every day. As the pain gets better, keep doing range-of-motion exercises.  Ask your doctor for exercises that will make the muscles around the joint stronger. Do these as directed. · You can slowly return to the activity that caused the pain, but do it with less effort until you can do it without pain or swelling. Be sure to warm up before and stretch after you do the activity. When should you call for help? Call your doctor now or seek immediate medical care if:  ? · You have new or worse symptoms of infection, such as:  ¨ Increased pain, swelling, warmth, or redness. ¨ Red streaks leading from the area. ¨ Pus draining from the area. ¨ A fever. ? Watch closely for changes in your health, and be sure to contact your doctor if:  ? · You do not get better as expected. Where can you learn more? Go to http://mela-lori.info/. Enter B333 in the search box to learn more about \"Bursitis: Care Instructions. \"  Current as of: March 21, 2017  Content Version: 11.4  © 4503-1069 [employer]. Care instructions adapted under license by [employer] (which disclaims liability or warranty for this information). If you have questions about a medical condition or this instruction, always ask your healthcare professional. [place] [address] any warranty or liability for your use of this information. Bursitis of the Elbow: Care Instructions  Your Care Instructions  Bursitis is pain and swelling of the bursae. These are sacs of fluid that help your joints move smoothly. Olecranon bursitis is a type of bursitis that affects the back of the elbow. This is sometimes called Ron elbow because the bump that develops looks like the cartoon character Ron's elbow. Injury, overuse, or prolonged pressure on your elbow can cause this form of bursitis. Sometimes it happens when people have arthritis. It also can occur for unknown reasons. Treatment may include draining fluid from the bursa with a needle.  If your doctor thought there was infection, he or she may have prescribed antibiotics. You also may get shots of medicine into the bursa to help the swelling go down. Your elbow should get better in a few days or weeks. Follow-up care is a key part of your treatment and safety. Be sure to make and go to all appointments, and call your doctor if you are having problems. It's also a good idea to know your test results and keep a list of the medicines you take. How can you care for yourself at home? · Take pain medicines exactly as directed. ¨ If the doctor gave you a prescription medicine for pain, take it as prescribed. ¨ If you are not taking a prescription pain medicine, ask your doctor if you can take an over-the-counter medicine. ¨ Do not take two or more pain medicines at the same time unless the doctor told you to. Many pain medicines have acetaminophen, which is Tylenol. Too much acetaminophen (Tylenol) can be harmful. · If your doctor prescribed antibiotics, take them as directed. Do not stop taking them just because you feel better. You need to take the full course of antibiotics. · If your doctor gave you a sling, an elastic bandage, or a compression sleeve, wear it exactly as instructed. · Put ice or a cold pack on your elbow for 10 to 20 minutes at a time. Try to do this every 1 to 2 hours for the next 3 days (when you are awake) or until the swelling goes down. Put a thin cloth between the ice and your skin. · After 3 days, you can try heat, or alternate heat and ice. · Rest your elbow. Try to stop or reduce any activity that causes pain. · Wear elbow pads during physical activity to prevent injury. · Do not lean your elbows on tables or armrests. When should you call for help? Call your doctor now or seek immediate medical care if:  ? · You have new or worse symptoms of infection, such as:  ¨ Increased pain, swelling, warmth, or redness. ¨ Red streaks leading from the area. ¨ Pus draining from the area. ¨ A fever. ? Watch closely for changes in your health, and be sure to contact your doctor if:  ? · You do not get better as expected. Where can you learn more? Go to http://mela-lori.info/. Enter  in the search box to learn more about \"Bursitis of the Elbow: Care Instructions. \"  Current as of: March 21, 2017  Content Version: 11.4  © 1487-2207 [employer]. Care instructions adapted under license by [employer] (which disclaims liability or warranty for this information). If you have questions about a medical condition or this instruction, always ask your healthcare professional. [place] [address] any warranty or liability for your use of this information.

## 2019-01-02 ENCOUNTER — APPOINTMENT (OUTPATIENT)
Dept: GENERAL RADIOLOGY | Age: 59
End: 2019-01-02
Attending: EMERGENCY MEDICINE
Payer: MEDICAID

## 2019-01-02 ENCOUNTER — HOSPITAL ENCOUNTER (EMERGENCY)
Age: 59
Discharge: HOME | End: 2019-01-02
Attending: EMERGENCY MEDICINE
Payer: MEDICAID

## 2019-01-02 VITALS
OXYGEN SATURATION: 92 % | RESPIRATION RATE: 16 BRPM | DIASTOLIC BLOOD PRESSURE: 87 MMHG | TEMPERATURE: 97.1 F | HEIGHT: 68 IN | WEIGHT: 165 LBS | SYSTOLIC BLOOD PRESSURE: 137 MMHG | BODY MASS INDEX: 25.01 KG/M2 | HEART RATE: 77 BPM

## 2019-01-02 DIAGNOSIS — J20.9 ACUTE BRONCHITIS, UNSPECIFIED ORGANISM: Primary | ICD-10-CM

## 2019-01-02 PROCEDURE — 74011250637 HC RX REV CODE- 250/637: Performed by: PHYSICIAN ASSISTANT

## 2019-01-02 PROCEDURE — 71046 X-RAY EXAM CHEST 2 VIEWS: CPT

## 2019-01-02 PROCEDURE — 99283 EMERGENCY DEPT VISIT LOW MDM: CPT

## 2019-01-02 RX ORDER — CODEINE PHOSPHATE AND GUAIFENESIN 10; 100 MG/5ML; MG/5ML
5 SOLUTION ORAL
Status: COMPLETED | OUTPATIENT
Start: 2019-01-02 | End: 2019-01-02

## 2019-01-02 RX ORDER — AZITHROMYCIN 250 MG/1
TABLET, FILM COATED ORAL
Qty: 6 TAB | Refills: 0 | Status: SHIPPED | OUTPATIENT
Start: 2019-01-02 | End: 2021-04-20

## 2019-01-02 RX ORDER — CODEINE PHOSPHATE AND GUAIFENESIN 10; 100 MG/5ML; MG/5ML
SOLUTION ORAL
Qty: 120 ML | Refills: 0 | Status: SHIPPED | OUTPATIENT
Start: 2019-01-02 | End: 2021-04-20

## 2019-01-02 RX ADMIN — GUAIFENESIN AND CODEINE PHOSPHATE 5 ML: 100; 10 SOLUTION ORAL at 17:12

## 2019-01-02 NOTE — LETTER
[facility] 
[facility] EMERGENCY DEPT 
[address] [facility] [address] 
[phone] Work/School Note Date: 1/2/2019 To Whom It May concern: 
 
Angeles Angulo was seen and treated today in the emergency room by the following provider(s): 
Attending Provider: Radha Gonzales DO Physician Assistant: Allison Obregon, [address] [facility],[address] may return to work on 1/4/19. Sincerely, STEPH Lopez

## 2019-01-02 NOTE — DISCHARGE INSTRUCTIONS
Patient Education        Bronchitis: Care Instructions  Your Care Instructions    Bronchitis is inflammation of the bronchial tubes, which carry air to the lungs. The tubes swell and produce mucus, or phlegm. The mucus and inflamed bronchial tubes make you cough. You may have trouble breathing. Most cases of bronchitis are caused by viruses like those that cause colds. Antibiotics usually do not help and they may be harmful. Bronchitis usually develops rapidly and lasts about 2 to 3 weeks in otherwise healthy people. Follow-up care is a key part of your treatment and safety. Be sure to make and go to all appointments, and call your doctor if you are having problems. It's also a good idea to know your test results and keep a list of the medicines you take. How can you care for yourself at home? · Take all medicines exactly as prescribed. Call your doctor if you think you are having a problem with your medicine. · Get some extra rest.  · Take an over-the-counter pain medicine, such as acetaminophen (Tylenol), ibuprofen (Advil, Motrin), or naproxen (Aleve) to reduce fever and relieve body aches. Read and follow all instructions on the label. · Do not take two or more pain medicines at the same time unless the doctor told you to. Many pain medicines have acetaminophen, which is Tylenol. Too much acetaminophen (Tylenol) can be harmful. · Take an over-the-counter cough medicine that contains dextromethorphan to help quiet a dry, hacking cough so that you can sleep. Avoid cough medicines that have more than one active ingredient. Read and follow all instructions on the label. · Breathe moist air from a humidifier, hot shower, or sink filled with hot water. The heat and moisture will thin mucus so you can cough it out. · Do not smoke. Smoking can make bronchitis worse. If you need help quitting, talk to your doctor about stop-smoking programs and medicines.  These can increase your chances of quitting for good.  When should you call for help? Call 911 anytime you think you may need emergency care. For example, call if:    · You have severe trouble breathing.    Call your doctor now or seek immediate medical care if:    · You have new or worse trouble breathing.     · You cough up dark brown or bloody mucus (sputum).     · You have a new or higher fever.     · You have a new rash.    Watch closely for changes in your health, and be sure to contact your doctor if:    · You cough more deeply or more often, especially if you notice more mucus or a change in the color of your mucus.     · You are not getting better as expected. Where can you learn more? Go to http://mela-lori.info/. Enter H333 in the search box to learn more about \"Bronchitis: Care Instructions. \"  Current as of: December 6, 2017  Content Version: 11.8  © 2789-2933 [employer]. Care instructions adapted under license by [employer] (which disclaims liability or warranty for this information). If you have questions about a medical condition or this instruction, always ask your healthcare professional. [address] any warranty or liability for your use of this information.

## 2019-01-02 NOTE — ED TRIAGE NOTES
Patient presents to the ED for evaluation of productive cough that he has had all weekend. Patient states, \"I should have come in sooner, I spent all day in bed yesterday and today I am coughing up yellow stuff. \"

## 2019-01-02 NOTE — ED PROVIDER NOTES
EMERGENCY DEPARTMENT HISTORY AND PHYSICAL EXAM 
 
4:54 PM 
 
 
Date: 1/2/2019 Patient Name: Terry Villasenor History of Presenting Illness Chief Complaint Patient presents with  Cough History Provided By: Patient Chief Complaint: Cough Duration:  5 days Timing:  Acute Location: Respiratory Quality: Not reported Severity: Severe Modifying Factors: Robitussin, no improvement Associated Symptoms: Wheezing, back and chest pain Additional History (Context): Terry Villasenor is a 66 y.o. male with no pertinent PMHx who presents with an acute, severe cough with yellow sputum onset 5 days ago. Patient states he had contact with others who were sick, and that he is a smoker but stopped when his cough began. Associated symptoms include wheezing and back and chest pain from coughing. He denies HA, fever, and chills. He has taken Robitussin with no improvement. No other symptoms or concerns were expressed. PCP: None Current Facility-Administered Medications Medication Dose Route Frequency Provider Last Rate Last Dose  
 guaiFENesin-codeine (ROBITUSSIN AC) 100-10 mg/5 mL solution 5 mL  5 mL Oral NOW STEPH Neves Current Outpatient Medications Medication Sig Dispense Refill  guaiFENesin-codeine (ROBITUSSIN AC) 100-10 mg/5 mL solution 5mL every 4 hours prn 120 mL 0  
 azithromycin (ZITHROMAX Z-ILIA) 250 mg tablet Take two tablets today then one tablet daily 6 Tab 0  
 methylPREDNISolone (MEDROL, ILIA,) 4 mg tablet Take as directed 1 Dose Pack 0  
 HYDROcodone-acetaminophen (NORCO) 5-325 mg per tablet Take 1 Tab by mouth every four (4) hours as needed for Pain. Max Daily Amount: 6 Tabs. 12 Tab 0 Past History Past Medical History: 
History reviewed. No pertinent past medical history. Past Surgical History: 
History reviewed. No pertinent surgical history. Family History: 
History reviewed. No pertinent family history. Social History: 
Social History Tobacco Use  Smoking status: Current Every Day Smoker Packs/day: 1.00 Substance Use Topics  Alcohol use: Yes Comment: Ray Oates  Drug use: No  
 
 
Allergies: 
No Known Allergies Review of Systems Review of Systems Constitutional: Negative for activity change, chills, fatigue and fever. HENT: Negative for congestion and rhinorrhea. Eyes: Negative for visual disturbance. Respiratory: Positive for cough (with yellow sputum) and wheezing. Negative for shortness of breath. Cardiovascular: Positive for chest pain. Negative for palpitations. Gastrointestinal: Negative for abdominal pain, diarrhea, nausea and vomiting. Genitourinary: Negative for dysuria and hematuria. Musculoskeletal: Positive for back pain. Skin: Negative for rash. Neurological: Negative for dizziness, weakness, light-headedness and headaches. All other systems reviewed and are negative. Physical Exam  
 
Visit Vitals /87 (BP 1 Location: Left arm, BP Patient Position: At rest) Pulse 77 Temp 97.1 °F (36.2 °C) Resp 16 Ht 5' 8\" (1.727 m) Wt 74.8 kg (165 lb) SpO2 92% BMI 25.09 kg/m² Physical Exam  
Constitutional: He is oriented to person, place, and time. He appears well-developed and well-nourished. No distress. HENT:  
Head: Normocephalic and atraumatic. Right Ear: Hearing, tympanic membrane, external ear and ear canal normal.  
Left Ear: Hearing, tympanic membrane, external ear and ear canal normal.  
Nose: Nose normal.  
Mouth/Throat: Uvula is midline, oropharynx is clear and moist and mucous membranes are normal. No oropharyngeal exudate. Eyes: Conjunctivae are normal. Right eye exhibits no discharge. Left eye exhibits no discharge. Neck: Normal range of motion. Neck supple. Cardiovascular: Normal rate and regular rhythm. Pulmonary/Chest: Effort normal and breath sounds normal. He has no wheezes. Abdominal: Soft. Bowel sounds are normal. There is no tenderness. Musculoskeletal: Normal range of motion. Lymphadenopathy:  
  He has no cervical adenopathy. Neurological: He is alert and oriented to person, place, and time. Skin: Skin is warm and dry. No rash noted. He is not diaphoretic. Diagnostic Study Results Labs - No results found for this or any previous visit (from the past 12 hour(s)). Radiologic Studies -  
XR CHEST PA LAT    (Results Pending) Medical Decision Making I am the first provider for this patient. I reviewed the vital signs, available nursing notes, past medical history, past surgical history, family history and social history. Vital Signs-Reviewed the patient's vital signs. Pulse Oximetry Analysis -  92% on room air, WNL Cardiac Monitor: 
Rate: 77 bpm 
Rhythm:  Normal Sinus Rhythm Records Reviewed: Nursing Notes (Time of Review: 4:54 PM) 
 
ED Course: Progress Notes, Reevaluation, and Consults: 
 
Provider Notes (Medical Decision Making): pt c/o cough x 5 days. Intermittently productive. Afeb, vss. Non toxic. Lungs ctab. Xray clear. Supportive treatment. pcp f/u. Diagnosis Clinical Impression: 1. Acute bronchitis, unspecified organism Disposition: Discharge Follow-up Information Follow up With Specialties Details Why Contact Info [address] [place] [address] 
[phone] Medication List  
  
START taking these medications   
azithromycin 250 mg tablet Commonly known as:  Amy Linumb Take two tablets today then one tablet daily 
  
guaiFENesin-codeine 100-10 mg/5 mL solution Commonly known as:  ROBITUSSIN AC 
5mL every 4 hours prn ASK your doctor about these medications HYDROcodone-acetaminophen 5-325 mg per tablet Commonly known as:  Mariann Dieter Take 1 Tab by mouth every four (4) hours as needed for Pain. Max Daily Amount: 6 Tabs. methylPREDNISolone 4 mg tablet Commonly known as:  MEDROL (ILIA) Take as directed Where to Get Your Medications Information about where to get these medications is not yet available Ask your nurse or doctor about these medications · azithromycin 250 mg tablet · guaiFENesin-codeine 100-10 mg/5 mL solution 
  
 
_______________________________ Scribe Attestation Triny Baptiste acting as a scribe for and in the presence of Bijal Jaramillo, [id number] 
January 02, 2019 at 4:54 PM 
    
Provider Attestation:     
I personally performed the services described in the documentation, reviewed the documentation, as recorded by the scribe in my presence, and it accurately and completely records my words and actions. January 02, 2019 at 4:54 PM - STEPH Azar 
_______________________________

## 2019-01-02 NOTE — ED NOTES
I have reviewed discharge instructions with the patient. The patient verbalized understanding. Discharged home ambulatory, in stable condition. Patient instructed not to drive. Patient verbalized understanding. States, \"my friend is coming to pick me up. \"

## 2019-02-27 ENCOUNTER — HOSPITAL ENCOUNTER (EMERGENCY)
Age: 59
Discharge: HOME | End: 2019-02-27
Attending: EMERGENCY MEDICINE
Payer: MEDICAID

## 2019-02-27 ENCOUNTER — APPOINTMENT (OUTPATIENT)
Dept: GENERAL RADIOLOGY | Age: 59
End: 2019-02-27
Attending: EMERGENCY MEDICINE
Payer: MEDICAID

## 2019-02-27 VITALS
HEIGHT: 68 IN | BODY MASS INDEX: 25.01 KG/M2 | OXYGEN SATURATION: 98 % | HEART RATE: 113 BPM | TEMPERATURE: 98.7 F | WEIGHT: 165 LBS | RESPIRATION RATE: 20 BRPM | SYSTOLIC BLOOD PRESSURE: 133 MMHG | DIASTOLIC BLOOD PRESSURE: 88 MMHG

## 2019-02-27 DIAGNOSIS — J40 BRONCHITIS: Primary | ICD-10-CM

## 2019-02-27 PROCEDURE — 71046 X-RAY EXAM CHEST 2 VIEWS: CPT

## 2019-02-27 PROCEDURE — 94640 AIRWAY INHALATION TREATMENT: CPT

## 2019-02-27 PROCEDURE — 74011000250 HC RX REV CODE- 250: Performed by: EMERGENCY MEDICINE

## 2019-02-27 PROCEDURE — 74011636637 HC RX REV CODE- 636/637: Performed by: EMERGENCY MEDICINE

## 2019-02-27 PROCEDURE — 93005 ELECTROCARDIOGRAM TRACING: CPT

## 2019-02-27 PROCEDURE — 74011250637 HC RX REV CODE- 250/637: Performed by: EMERGENCY MEDICINE

## 2019-02-27 PROCEDURE — 99284 EMERGENCY DEPT VISIT MOD MDM: CPT

## 2019-02-27 PROCEDURE — 77030029684 HC NEB SM VOL KT MONA -A

## 2019-02-27 RX ORDER — PREDNISONE 20 MG/1
60 TABLET ORAL
Status: COMPLETED | OUTPATIENT
Start: 2019-02-27 | End: 2019-02-27

## 2019-02-27 RX ORDER — AZITHROMYCIN 250 MG/1
TABLET, FILM COATED ORAL
Qty: 6 TAB | Refills: 0 | Status: SHIPPED | OUTPATIENT
Start: 2019-02-27 | End: 2019-03-04

## 2019-02-27 RX ORDER — ALBUTEROL SULFATE 0.83 MG/ML
10 SOLUTION RESPIRATORY (INHALATION)
Status: COMPLETED | OUTPATIENT
Start: 2019-02-27 | End: 2019-02-27

## 2019-02-27 RX ORDER — ALBUTEROL SULFATE 90 UG/1
1 AEROSOL, METERED RESPIRATORY (INHALATION)
Qty: 1 INHALER | Refills: 0 | Status: SHIPPED | OUTPATIENT
Start: 2019-02-27

## 2019-02-27 RX ORDER — PREDNISONE 50 MG/1
50 TABLET ORAL DAILY
Qty: 5 TAB | Refills: 0 | Status: SHIPPED | OUTPATIENT
Start: 2019-02-27 | End: 2019-03-04

## 2019-02-27 RX ORDER — IPRATROPIUM BROMIDE AND ALBUTEROL SULFATE 2.5; .5 MG/3ML; MG/3ML
3 SOLUTION RESPIRATORY (INHALATION) ONCE
Status: COMPLETED | OUTPATIENT
Start: 2019-02-27 | End: 2019-02-27

## 2019-02-27 RX ORDER — GUAIFENESIN/DEXTROMETHORPHAN 100-10MG/5
10 SYRUP ORAL
Status: COMPLETED | OUTPATIENT
Start: 2019-02-27 | End: 2019-02-27

## 2019-02-27 RX ADMIN — IPRATROPIUM BROMIDE AND ALBUTEROL SULFATE 3 ML: .5; 3 SOLUTION RESPIRATORY (INHALATION) at 14:24

## 2019-02-27 RX ADMIN — GUAIFENESIN AND DEXTROMETHORPHAN 10 ML: 100; 10 SYRUP ORAL at 13:57

## 2019-02-27 RX ADMIN — PREDNISONE 60 MG: 20 TABLET ORAL at 13:57

## 2019-02-27 RX ADMIN — ALBUTEROL SULFATE 10 MG: 2.5 SOLUTION RESPIRATORY (INHALATION) at 14:33

## 2019-02-27 NOTE — ED NOTES
Pt is very wheezy with bad productive cough. Pt having whitish yellow sputum. Pt reporting that his cough went and away and is now back. Pt stating that he can't breathe when he starts coughing excessively. Pt denies any needs currently.

## 2019-02-27 NOTE — ED NOTES
I have reviewed discharge instructions and prescriptions with the patient. The patient verbalized understanding.

## 2019-02-27 NOTE — DISCHARGE INSTRUCTIONS
Patient Education        Bronchitis: Care Instructions  Your Care Instructions    Bronchitis is inflammation of the bronchial tubes, which carry air to the lungs. The tubes swell and produce mucus, or phlegm. The mucus and inflamed bronchial tubes make you cough. You may have trouble breathing. Most cases of bronchitis are caused by viruses like those that cause colds. Antibiotics usually do not help and they may be harmful. Bronchitis usually develops rapidly and lasts about 2 to 3 weeks in otherwise healthy people. Follow-up care is a key part of your treatment and safety. Be sure to make and go to all appointments, and call your doctor if you are having problems. It's also a good idea to know your test results and keep a list of the medicines you take. How can you care for yourself at home? · Take all medicines exactly as prescribed. Call your doctor if you think you are having a problem with your medicine. · Get some extra rest.  · Take an over-the-counter pain medicine, such as acetaminophen (Tylenol), ibuprofen (Advil, Motrin), or naproxen (Aleve) to reduce fever and relieve body aches. Read and follow all instructions on the label. · Do not take two or more pain medicines at the same time unless the doctor told you to. Many pain medicines have acetaminophen, which is Tylenol. Too much acetaminophen (Tylenol) can be harmful. · Take an over-the-counter cough medicine that contains dextromethorphan to help quiet a dry, hacking cough so that you can sleep. Avoid cough medicines that have more than one active ingredient. Read and follow all instructions on the label. · Breathe moist air from a humidifier, hot shower, or sink filled with hot water. The heat and moisture will thin mucus so you can cough it out. · Do not smoke. Smoking can make bronchitis worse. If you need help quitting, talk to your doctor about stop-smoking programs and medicines.  These can increase your chances of quitting for good.  When should you call for help? Call 911 anytime you think you may need emergency care. For example, call if:    · You have severe trouble breathing.    Call your doctor now or seek immediate medical care if:    · You have new or worse trouble breathing.     · You cough up dark brown or bloody mucus (sputum).     · You have a new or higher fever.     · You have a new rash.    Watch closely for changes in your health, and be sure to contact your doctor if:    · You cough more deeply or more often, especially if you notice more mucus or a change in the color of your mucus.     · You are not getting better as expected. Where can you learn more? Go to http://mela-lori.info/. Enter H333 in the search box to learn more about \"Bronchitis: Care Instructions. \"  Current as of: September 5, 2018  Content Version: 11.9  © 1309-1064 [employer]. Care instructions adapted under license by [employer] (which disclaims liability or warranty for this information). If you have questions about a medical condition or this instruction, always ask your healthcare professional. [address] any warranty or liability for your use of this information.

## 2019-02-27 NOTE — ED PROVIDER NOTES
EMERGENCY DEPARTMENT HISTORY AND PHYSICAL EXAM 
 
Date: 2/27/2019 Patient Name: Dewayne Dias History of Presenting Illness Chief Complaint Patient presents with  Cough  Shortness of Breath History Provided By: Patient Chief Complaint: cough Duration: several Days Timing:  Acute Location: chest 
Quality: Tightness Severity: Severe Modifying Factors: stopped smoking @1mo ago but still 'cheats' Associated Symptoms: wheezing, sob Additional History (Context): Dewayne Dias is a 64 y.o. male with bronchitis, tobacco abuse who presents with a productive cough w/yellow-white sputum for several days. No inhaler at home. Feels once he starts coughing he can't stop. Denies fever, post-tussive emesis. PCP: None Current Outpatient Medications Medication Sig Dispense Refill  albuterol (PROVENTIL HFA, VENTOLIN HFA, PROAIR HFA) 90 mcg/actuation inhaler Take 1 Puff by inhalation every four (4) hours as needed for Wheezing. 1 Inhaler 0  predniSONE (DELTASONE) 50 mg tablet Take 1 Tab by mouth daily for 5 days. 5 Tab 0  
 dextromethorphan-guaiFENesin (ROBITUSSIN-DM)  mg/5 mL syrup Take 10 mL by mouth every six (6) hours as needed for Cough. 240 mL 0  
 azithromycin (ZITHROMAX Z-ILIA) 250 mg tablet Take two tablets on day one; then take one tablet daily until gone. 6 Tab 0  
 Camphor-Eucalyptus Oil-Menthol (VICKS VAPORUB) 4.8-1.2-2.6 % oint 1 Actuation(s) by Apply Externally route three (3) times daily as needed for Cough. 60 g 0  
 guaiFENesin-codeine (ROBITUSSIN AC) 100-10 mg/5 mL solution 5mL every 4 hours prn 120 mL 0  
 azithromycin (ZITHROMAX Z-ILIA) 250 mg tablet Take two tablets today then one tablet daily 6 Tab 0  
 methylPREDNISolone (MEDROL, ILIA,) 4 mg tablet Take as directed 1 Dose Pack 0  
 HYDROcodone-acetaminophen (NORCO) 5-325 mg per tablet Take 1 Tab by mouth every four (4) hours as needed for Pain. Max Daily Amount: 6 Tabs. 12 Tab 0 Past History Past Medical History: No past medical history on file. Past Surgical History: No past surgical history on file. Family History: No family history on file. Social History: 
Social History Tobacco Use  Smoking status: Current Every Day Smoker Packs/day: 1.00 Substance Use Topics  Alcohol use: Yes Comment: Ray Oates  Drug use: No  
 
 
Allergies: 
No Known Allergies Review of Systems Review of Systems Constitutional: Negative for fever. Respiratory: Positive for cough, shortness of breath and wheezing. Cardiovascular: Positive for palpitations. Negative for chest pain and leg swelling. All other systems reviewed and are negative. All Other Systems Negative Physical Exam  
 
Vitals:  
 02/27/19 1259 02/27/19 1300 02/27/19 1310 02/27/19 1330 BP: (!) 159/105  156/89 (!) 167/110 Pulse: (!) 113   (!) 104 Resp: 16   21 Temp: 98.7 °F (37.1 °C) SpO2: 92%   91% Weight:  74.8 kg (165 lb) Height:  5' 8\" (1.727 m) Physical Exam  
Constitutional: Vital signs are normal. He appears well-developed and well-nourished. He is active. Non-toxic appearance. He does not appear ill. He appears distressed. HENT:  
Head: Normocephalic and atraumatic. Neck: Normal range of motion. Neck supple. Carotid bruit is not present. No tracheal deviation present. No thyromegaly present. Cardiovascular: Normal rate, regular rhythm and normal heart sounds. Exam reveals no gallop and no friction rub. No murmur heard. Pulmonary/Chest: Effort normal. No stridor. No respiratory distress. He has wheezes. He has no rales. He exhibits no tenderness. Abdominal: Soft. He exhibits no distension and no mass. There is no tenderness. There is no rebound, no guarding and no CVA tenderness. Musculoskeletal: Normal range of motion. Neurological: He is alert. Skin: Skin is warm, dry and intact. He is not diaphoretic. No pallor. Psychiatric: He has a normal mood and affect. His speech is normal and behavior is normal. Judgment and thought content normal.  
Nursing note and vitals reviewed. Diagnostic Study Results Labs - Recent Results (from the past 12 hour(s)) EKG, 12 LEAD, INITIAL Collection Time: 02/27/19  1:04 PM  
Result Value Ref Range Ventricular Rate 106 BPM  
 Atrial Rate 106 BPM  
 P-R Interval 222 ms QRS Duration 86 ms  
 Q-T Interval 318 ms QTC Calculation (Bezet) 422 ms Calculated P Axis 79 degrees Calculated R Axis 61 degrees Calculated T Axis 48 degrees Diagnosis Sinus tachycardia with 1st degree AV block Nonspecific ST abnormality Abnormal ECG No previous ECGs available Radiologic Studies -  
XR CHEST PA LAT Final Result IMPRESSION:  
  
Negative study. CT Results  (Last 48 hours) None CXR Results  (Last 48 hours) 02/27/19 1404  XR CHEST PA LAT Final result Impression:  IMPRESSION:  
   
Negative study. Narrative:  EXAM:  Chest Frontal and Lateral.  
   
INDICATION:  Cough, shortness of breath COMPARISON:  01/02/19 TECHNIQUE:  PA and lateral views. FINDINGS:   
   
- Both lungs are clear.   
- No pleural effusion or pneumothorax is detected. - The cardiac silhouette, mediastinum and hilar regions are unremarkable. - No significant interval changes are observed. Medical Decision Making I am the first provider for this patient. I reviewed the vital signs, available nursing notes, past medical history, past surgical history, family history and social history. Vital Signs-Reviewed the patient's vital signs. Records Reviewed: Nursing Notes and Old Medical Records Procedures: 
Procedures Provider Notes (Medical Decision Making): feels like congestion is breaking up in his chest and breathing easier. Wheezing has lessened. Encouraged smoking cessation. MED RECONCILIATION: 
No current facility-administered medications for this encounter. Current Outpatient Medications Medication Sig  
 albuterol (PROVENTIL HFA, VENTOLIN HFA, PROAIR HFA) 90 mcg/actuation inhaler Take 1 Puff by inhalation every four (4) hours as needed for Wheezing.  predniSONE (DELTASONE) 50 mg tablet Take 1 Tab by mouth daily for 5 days.  dextromethorphan-guaiFENesin (ROBITUSSIN-DM)  mg/5 mL syrup Take 10 mL by mouth every six (6) hours as needed for Cough.  azithromycin (ZITHROMAX Z-ILIA) 250 mg tablet Take two tablets on day one; then take one tablet daily until gone.  Camphor-Eucalyptus Oil-Menthol (VICKS VAPORUB) 4.8-1.2-2.6 % oint 1 Actuation(s) by Apply Externally route three (3) times daily as needed for Cough.  guaiFENesin-codeine (ROBITUSSIN AC) 100-10 mg/5 mL solution 5mL every 4 hours prn  
 azithromycin (ZITHROMAX Z-ILIA) 250 mg tablet Take two tablets today then one tablet daily  methylPREDNISolone (MEDROL, ILIA,) 4 mg tablet Take as directed  HYDROcodone-acetaminophen (NORCO) 5-325 mg per tablet Take 1 Tab by mouth every four (4) hours as needed for Pain. Max Daily Amount: 6 Tabs. Disposition: 
home DISCHARGE NOTE:  
3:06 PM 
 
Pt has been reexamined. Patient has no new complaints, changes, or physical findings. Care plan outlined and precautions discussed. Results of CXR were reviewed with the patient. All medications were reviewed with the patient; will d/c home with steroid, inhaler, ABX, anti-tussive, decongestant. All of pt's questions and concerns were addressed. Patient was instructed and agrees to follow up with PCP, as well as to return to the ED upon further deterioration. Patient is ready to go home. Follow-up Information Follow up With Specialties Details Why Contact Info Michelle Lloyd MD Emergency Medicine Schedule an appointment as soon as possible for a visit in 1 day  [address] [place] [address] [address] 
 [place] [id number] 
[phone] [facility] EMERGENCY DEPT Emergency Medicine  If symptoms worsen return immediately [address] Current Discharge Medication List  
  
START taking these medications Details  
albuterol (PROVENTIL HFA, VENTOLIN HFA, PROAIR HFA) 90 mcg/actuation inhaler Take 1 Puff by inhalation every four (4) hours as needed for Wheezing. Qty: 1 Inhaler, Refills: 0  
  
predniSONE (DELTASONE) 50 mg tablet Take 1 Tab by mouth daily for 5 days. Qty: 5 Tab, Refills: 0  
  
dextromethorphan-guaiFENesin (ROBITUSSIN-DM)  mg/5 mL syrup Take 10 mL by mouth every six (6) hours as needed for Cough. Qty: 240 mL, Refills: 0  
  
!! azithromycin (ZITHROMAX Z-ILIA) 250 mg tablet Take two tablets on day one; then take one tablet daily until gone. Qty: 6 Tab, Refills: 0 Camphor-Eucalyptus Oil-Menthol (VICKS VAPORUB) 4.8-1.2-2.6 % oint 1 Actuation(s) by Apply Externally route three (3) times daily as needed for Cough. Qty: 60 g, Refills: 0  
  
 !! - Potential duplicate medications found. Please discuss with provider. CONTINUE these medications which have NOT CHANGED Details  
!! azithromycin (ZITHROMAX Z-ILIA) 250 mg tablet Take two tablets today then one tablet daily 
Qty: 6 Tab, Refills: 0  
  
 !! - Potential duplicate medications found. Please discuss with provider. Diagnosis Clinical Impression: 1. Bronchitis

## 2019-02-27 NOTE — ED TRIAGE NOTES
Pt arrived c/o cough and sob, sob since yesterday, was prescribed zpack and cough syrup during last visit, states it went away and recently came back, states cough is productive and its white and yellow.

## 2019-02-27 NOTE — LETTER
NOTIFICATION RETURN TO WORK / SCHOOL 
 
2/27/2019 3:13 PM 
 
Mr. Dewayne Dias [address] [place], [place] [id number] To Whom It May Concern: 
 
Dewayne Dias is currently under the care of [facility] EMERGENCY DEPT. He will return to work/school on: 3/1/19. If there are questions or concerns please have the patient contact our office.  
 
 
 
Sincerely, 
 
 
Gail Holloway PA-C

## 2019-02-28 LAB
ATRIAL RATE: 106 BPM
CALCULATED P AXIS, ECG09: 79 DEGREES
CALCULATED R AXIS, ECG10: 61 DEGREES
CALCULATED T AXIS, ECG11: 48 DEGREES
DIAGNOSIS, 93000: NORMAL
P-R INTERVAL, ECG05: 222 MS
Q-T INTERVAL, ECG07: 318 MS
QRS DURATION, ECG06: 86 MS
QTC CALCULATION (BEZET), ECG08: 422 MS
VENTRICULAR RATE, ECG03: 106 BPM

## 2020-05-06 ENCOUNTER — HOSPITAL ENCOUNTER (EMERGENCY)
Age: 60
Discharge: HOME | End: 2020-05-06
Attending: EMERGENCY MEDICINE
Payer: MEDICAID

## 2020-05-06 VITALS
DIASTOLIC BLOOD PRESSURE: 84 MMHG | WEIGHT: 165 LBS | SYSTOLIC BLOOD PRESSURE: 152 MMHG | HEART RATE: 58 BPM | HEIGHT: 68 IN | OXYGEN SATURATION: 99 % | BODY MASS INDEX: 25.01 KG/M2 | RESPIRATION RATE: 16 BRPM | TEMPERATURE: 97.3 F

## 2020-05-06 DIAGNOSIS — J34.89 RHINORRHEA: ICD-10-CM

## 2020-05-06 DIAGNOSIS — R50.9 FEVER, UNSPECIFIED FEVER CAUSE: Primary | ICD-10-CM

## 2020-05-06 PROCEDURE — 99281 EMR DPT VST MAYX REQ PHY/QHP: CPT

## 2020-05-06 NOTE — ED PROVIDER NOTES
EMERGENCY DEPARTMENT HISTORY AND PHYSICAL EXAM  This was created with voice recognition software and transcription errors may be present. 8:48 AM  Date: 5/6/2020  Patient Name: Sue Garza    History of Presenting Illness     Chief Complaint:    History Provided By:     HPI: Sue Garza is a 64 y.o. male Merl Faes for work note. Patient states that last Sunday he felt like he might be getting sick he had a fever some runny nose and this lasted under a day. He states he called him for work last Monday and then felt better the following day. His work because of his illness said he is unable to return until he gets a work note. He currently has no complaints    PCP: None      Past History     Past Medical History:  No past medical history on file. Past Surgical History:  No past surgical history on file. Family History:  No family history on file. Social History:  Social History     Tobacco Use    Smoking status: Current Every Day Smoker     Packs/day: 1.00   Substance Use Topics    Alcohol use: Yes     Comment: Ocassionally    Drug use: No       Allergies:  No Known Allergies    Review of Systems     Review of Systems   Constitutional: Negative for fatigue and fever. HENT: Negative for congestion. All other systems reviewed and are negative. 10 point review of systems otherwise negative unless noted in HPI. Physical Exam       Physical Exam  Constitutional:       Appearance: He is well-developed. HENT:      Head: Normocephalic and atraumatic. Eyes:      Pupils: Pupils are equal, round, and reactive to light. Neck:      Musculoskeletal: Normal range of motion and neck supple. Cardiovascular:      Rate and Rhythm: Normal rate and regular rhythm. Heart sounds: Normal heart sounds. No murmur. No friction rub. Pulmonary:      Effort: Pulmonary effort is normal. No respiratory distress. Breath sounds: Normal breath sounds. No wheezing.    Abdominal:      General: There is no distension. Palpations: Abdomen is soft. Tenderness: There is no abdominal tenderness. There is no guarding or rebound. Musculoskeletal: Normal range of motion. Skin:     General: Skin is warm and dry. Neurological:      Mental Status: He is alert and oriented to person, place, and time. Psychiatric:         Behavior: Behavior normal.         Thought Content: Thought content normal.         Diagnostic Study Results     Vital Signs  EKG:  Labs:   Imaging:     Medical Decision Making     ED Course: Progress Notes, Reevaluation, and Consults:      Provider Notes (Medical Decision Making): Essentially asymptomatic. Based on current CDC guidelines based on the symptoms a strategy 10 days has since onset 32 hours and symptoms. He is able to return to work on the 14th based on that. Diagnosis     Clinical Impression:   1. Fever, unspecified fever cause    2. Rhinorrhea        Disposition:    Patient's Medications   Start Taking    No medications on file   Continue Taking    ALBUTEROL (PROVENTIL HFA, VENTOLIN HFA, PROAIR HFA) 90 MCG/ACTUATION INHALER    Take 1 Puff by inhalation every four (4) hours as needed for Wheezing. AZITHROMYCIN (ZITHROMAX Z-ILIA) 250 MG TABLET    Take two tablets today then one tablet daily    CAMPHOR-EUCALYPTUS OIL-MENTHOL (VICKS VAPORUB) 4.8-1.2-2.6 % OINT    1 Actuation(s) by Apply Externally route three (3) times daily as needed for Cough. DEXTROMETHORPHAN-GUAIFENESIN (ROBITUSSIN-DM)  MG/5 ML SYRUP    Take 10 mL by mouth every six (6) hours as needed for Cough. GUAIFENESIN-CODEINE (ROBITUSSIN AC) 100-10 MG/5 ML SOLUTION    5mL every 4 hours prn    HYDROCODONE-ACETAMINOPHEN (NORCO) 5-325 MG PER TABLET    Take 1 Tab by mouth every four (4) hours as needed for Pain. Max Daily Amount: 6 Tabs.     METHYLPREDNISOLONE (MEDROL, ILIA,) 4 MG TABLET    Take as directed   These Medications have changed    No medications on file   Stop Taking    No medications on file

## 2020-05-06 NOTE — LETTER
NOTIFICATION RETURN TO WORK / SCHOOL 
 
5/6/2020 8:42 AM 
 
Mr. Lawrence Mckenna [address] [id number] To Whom It May Concern: 
 
Lawrence Mckenna is currently under the care of [facility] EMERGENCY DEPT. He will return to work/school on: 5/14 (10 days post onset) Based on cdc guidelines from today no extended to 10 days post onset of symptoms and 3 days post resolution of symptoms. If there are questions or concerns please have the patient contact our office.  
 
 
 
Sincerely, 
 
 
Zaira Valdes MD

## 2020-05-07 ENCOUNTER — PATIENT OUTREACH (OUTPATIENT)
Dept: FAMILY MEDICINE CLINIC | Age: 60
End: 2020-05-07

## 2020-05-07 NOTE — PROGRESS NOTES
.Patient contacted regarding recent discharge and COVID-19 risk   Care Transition Nurse/ Ambulatory Care Manager contacted the patient by telephone to perform post discharge assessment. ACM had to leave a message for return call  . Patient seen in ED to get a work note, had no current complaint stayed off work 3 or 4 days ago for allergy/cold symptoms. Per ED notes. Patient has following risk factors of: COPD.

## 2020-05-21 ENCOUNTER — PATIENT OUTREACH (OUTPATIENT)
Dept: FAMILY MEDICINE CLINIC | Age: 60
End: 2020-05-21

## 2020-05-21 NOTE — PROGRESS NOTES
.Patient resolved from Transition of Care episode on 5/21/2020  UNABLE TO MAKE CONTACT. Patient/family has been provided the following resources and education related to COVID-19:                         Signs, symptoms and red flags related to COVID-19            CDC exposure and quarantine guidelines            Conduit exposure contact - [phone]            Contact for their local Department of Health                 Patient currently reports that the following symptoms have improved:  no new symptoms. No further outreach scheduled with this CTN/ACM. Episode of Care resolved. Patient has this CTN/ACM contact information if future needs arise.

## 2020-12-16 ENCOUNTER — HOSPITAL ENCOUNTER (EMERGENCY)
Age: 60
Discharge: LEFT BEFORE DISCHARGE | End: 2020-12-16
Attending: EMERGENCY MEDICINE
Payer: MEDICAID

## 2020-12-16 VITALS
SYSTOLIC BLOOD PRESSURE: 175 MMHG | DIASTOLIC BLOOD PRESSURE: 100 MMHG | OXYGEN SATURATION: 98 % | WEIGHT: 165 LBS | RESPIRATION RATE: 18 BRPM | HEIGHT: 68 IN | BODY MASS INDEX: 25.01 KG/M2 | TEMPERATURE: 98.3 F | HEART RATE: 96 BPM

## 2020-12-16 DIAGNOSIS — R19.7 DIARRHEA, UNSPECIFIED TYPE: ICD-10-CM

## 2020-12-16 DIAGNOSIS — Z53.20 PATIENT LEFT BEFORE TREATMENT COMPLETED: Primary | ICD-10-CM

## 2020-12-16 PROCEDURE — 99282 EMERGENCY DEPT VISIT SF MDM: CPT

## 2020-12-16 NOTE — ED PROVIDER NOTES
EMERGENCY DEPARTMENT HISTORY AND PHYSICAL EXAM    8:37 AM      Date: 12/16/2020  Patient Name: Sangita Turcios    History of Presenting Illness     Chief Complaint   Patient presents with    Fever         History Provided By: Patient    Additional History (Context): Sangita Turcios is a 64 y.o. male with No significant past medical history who presents with tactile fevers and diarrhea for 3 days. Patient did not take his temperature but he felt the warm and had chills. Patient states he got tested for Covid yesterday and was negative. He has had 4 episodes of diarrhea last night. Patient states he feels like he has a \"GI bug\". Patient states he has decreased appetite. She denies chest pain, cough, nausea or vomiting. Patient denies any urinary symptoms. Patient admits to smoking 1 pack/day. He admits to alcohol use but not daily. He denies recreational drug use. PCP: None        Current Outpatient Medications   Medication Sig Dispense Refill    albuterol (PROVENTIL HFA, VENTOLIN HFA, PROAIR HFA) 90 mcg/actuation inhaler Take 1 Puff by inhalation every four (4) hours as needed for Wheezing. 1 Inhaler 0    dextromethorphan-guaiFENesin (ROBITUSSIN-DM)  mg/5 mL syrup Take 10 mL by mouth every six (6) hours as needed for Cough. 240 mL 0    Camphor-Eucalyptus Oil-Menthol (VICKS VAPORUB) 4.8-1.2-2.6 % oint 1 Actuation(s) by Apply Externally route three (3) times daily as needed for Cough. 60 g 0    guaiFENesin-codeine (ROBITUSSIN AC) 100-10 mg/5 mL solution 5mL every 4 hours prn 120 mL 0    azithromycin (ZITHROMAX Z-ILIA) 250 mg tablet Take two tablets today then one tablet daily 6 Tab 0    methylPREDNISolone (MEDROL, ILIA,) 4 mg tablet Take as directed 1 Dose Pack 0    HYDROcodone-acetaminophen (NORCO) 5-325 mg per tablet Take 1 Tab by mouth every four (4) hours as needed for Pain. Max Daily Amount: 6 Tabs. 12 Tab 0       Past History     Past Medical History:  No past medical history on file.     Past Surgical History:  No past surgical history on file. Family History:  No family history on file. Social History:  Social History     Tobacco Use    Smoking status: Current Every Day Smoker     Packs/day: 1.00   Substance Use Topics    Alcohol use: Yes     Comment: Ocassionally    Drug use: No       Allergies:  No Known Allergies      Review of Systems       Review of Systems   Constitutional: Positive for chills and fever. Negative for diaphoresis. HENT: Negative. Negative for congestion, rhinorrhea and sore throat. Eyes: Negative. Negative for pain, discharge and redness. Respiratory: Negative. Negative for cough, chest tightness, shortness of breath and wheezing. Cardiovascular: Negative. Negative for chest pain. Gastrointestinal: Positive for diarrhea. Negative for abdominal pain, constipation, nausea and vomiting. Genitourinary: Negative. Negative for dysuria, flank pain, frequency, hematuria and urgency. Musculoskeletal: Negative. Negative for back pain and neck pain. Skin: Negative. Negative for rash. Neurological: Negative. Negative for syncope, weakness, numbness and headaches. Psychiatric/Behavioral: Negative. All other systems reviewed and are negative. Physical Exam     Visit Vitals  BP (!) 175/100 (BP 1 Location: Right arm, BP Patient Position: At rest)   Pulse 96   Temp 98.3 °F (36.8 °C)   Resp 18   Ht 5' 8\" (1.727 m)   Wt 74.8 kg (165 lb)   SpO2 98%   BMI 25.09 kg/m²         Physical Exam  Vitals signs and nursing note reviewed. Constitutional:       General: He is not in acute distress. Appearance: Normal appearance. He is well-developed. He is not ill-appearing, toxic-appearing or diaphoretic. HENT:      Head: Normocephalic and atraumatic. Mouth/Throat:      Pharynx: No oropharyngeal exudate. Eyes:      General: No scleral icterus. Conjunctiva/sclera: Conjunctivae normal.      Pupils: Pupils are equal, round, and reactive to light. Neck:      Musculoskeletal: Normal range of motion and neck supple. Thyroid: No thyromegaly. Vascular: No hepatojugular reflux or JVD. Trachea: No tracheal deviation. Cardiovascular:      Rate and Rhythm: Normal rate and regular rhythm. Pulses: Normal pulses. Radial pulses are 2+ on the right side and 2+ on the left side. Dorsalis pedis pulses are 2+ on the right side and 2+ on the left side. Heart sounds: Normal heart sounds, S1 normal and S2 normal. No murmur. No gallop. No S3 or S4 sounds. Pulmonary:      Effort: Pulmonary effort is normal. No respiratory distress. Breath sounds: Normal breath sounds. No decreased breath sounds, wheezing, rhonchi or rales. Abdominal:      General: Bowel sounds are normal. There is no distension. Palpations: Abdomen is soft. Abdomen is not rigid. There is no mass. Tenderness: There is no abdominal tenderness. There is no guarding or rebound. Negative signs include Al's sign and McBurney's sign. Comments: Unremarkable abdominal exam.   Musculoskeletal: Normal range of motion. Comments: Strength 5 out of 5 throughout. Lymphadenopathy:      Head:      Right side of head: No submental, submandibular, preauricular or occipital adenopathy. Left side of head: No submental, submandibular, preauricular or occipital adenopathy. Cervical: No cervical adenopathy. Upper Body:      Right upper body: No supraclavicular adenopathy. Left upper body: No supraclavicular adenopathy. Skin:     General: Skin is warm and dry. Findings: No rash. Neurological:      Mental Status: He is alert. He is not disoriented. GCS: GCS eye subscore is 4. GCS verbal subscore is 5. GCS motor subscore is 6. Cranial Nerves: No cranial nerve deficit. Sensory: No sensory deficit.       Coordination: Coordination normal.      Gait: Gait normal.      Deep Tendon Reflexes: Reflexes are normal and symmetric. Comments: Grossly intact. Psychiatric:         Speech: Speech normal.         Behavior: Behavior normal.         Thought Content: Thought content normal.         Judgment: Judgment normal.           Diagnostic Study Results     Labs -  No results found for this or any previous visit (from the past 12 hour(s)). Radiologic Studies -   XR ABD ACUTE W 1 V CHEST    (Results Pending)         Medical Decision Making   Provider Notes (Medical Decision Making):  MDM  Number of Diagnoses or Management Options  Diagnosis management comments: DDX: Gastritis, gerd, peptic ulcer disease, cholecystitis, pancreatitis, gastroenteritis, hepatitis, constipation related pain, appendicitis pain, diverticulitis, urinary tract infection, obstruction, abdominal wall pain, atypical cardiac (ami or anginal pain), referred pain from pulmonary process (pneumonia, empyema), or combination of the above versus many other processes. Patient is a 82-year-old  male who presents with 3-day history of diarrhea with a negative Covid test.  Will order basic abdominal labs and x-ray. I am the first provider for this patient. I reviewed the vital signs, available nursing notes, past medical history, past surgical history, family history and social history. Vital Signs-Reviewed the patient's vital signs. Records Reviewed: Nursing Notes (Time of Review: 8:37 AM)    ED Course: Progress Notes, Reevaluation, and Consults:    When patient was brought back, he refused lab work. I spoke to the patient and told him in order for me to treat I have needed to find out what I was going to treat. I explained there was standard basic labs. Patient states he refused and did not want to stay. 8:45 AM 12/16/2020        Diagnosis       Patient eloped. Patient is to return to emergency department if any new or worsening condition. Clinical Impression:   1. Patient left before treatment completed    2.  Diarrhea, unspecified type        Disposition: Eloped     Follow-up Information     Follow up With Specialties Details Why [address]  In 1 day  Maureen Alexandra 3  [address]  [phone]             Attestation        Provider Attestation:     I personally performed the services described in the documentation, reviewed the documentation and it accurately and completely records my words and actions utilizing the [address] December 16, 2020 at 8:47 AM - Tanmay Jaeger DO    Disclaimer. It is dictated using utilizing voice recognition software. Unfortunately this leads to occasional typographical errors. I apologize in advance if the situation occurs. If questions arise please do not hesitate to contact me or call our department.

## 2020-12-16 NOTE — ED NOTES
Patient refused treatment and requested to leave provider aware and encourage patient to receive treatment patient still refused

## 2020-12-16 NOTE — ED TRIAGE NOTES
Pt complaining of fever, chills, and diarrhea  Since Sunday. Pt reports a COVID test completed yesterday.

## 2020-12-17 ENCOUNTER — HOSPITAL ENCOUNTER (EMERGENCY)
Age: 60
Discharge: HOME | End: 2020-12-17
Attending: EMERGENCY MEDICINE
Payer: MEDICAID

## 2020-12-17 VITALS
TEMPERATURE: 98 F | DIASTOLIC BLOOD PRESSURE: 107 MMHG | BODY MASS INDEX: 25.09 KG/M2 | OXYGEN SATURATION: 100 % | WEIGHT: 165 LBS | RESPIRATION RATE: 18 BRPM | HEART RATE: 70 BPM | SYSTOLIC BLOOD PRESSURE: 162 MMHG

## 2020-12-17 DIAGNOSIS — R19.7 DIARRHEA, UNSPECIFIED TYPE: Primary | ICD-10-CM

## 2020-12-17 DIAGNOSIS — E86.0 DEHYDRATION: ICD-10-CM

## 2020-12-17 LAB
ALBUMIN SERPL-MCNC: 3.8 G/DL (ref 3.4–5)
ALBUMIN/GLOB SERPL: 1 {RATIO} (ref 0.8–1.7)
ALP SERPL-CCNC: 63 U/L (ref 45–117)
ALT SERPL-CCNC: 38 U/L (ref 16–61)
ANION GAP SERPL CALC-SCNC: 2 MMOL/L (ref 3–18)
APPEARANCE UR: NORMAL
AST SERPL-CCNC: 24 U/L (ref 10–38)
BASOPHILS # BLD: 0 K/UL (ref 0–0.1)
BASOPHILS NFR BLD: 0 % (ref 0–2)
BILIRUB SERPL-MCNC: 1.1 MG/DL (ref 0.2–1)
BILIRUB UR QL: NEGATIVE
BUN SERPL-MCNC: 14 MG/DL (ref 7–18)
BUN/CREAT SERPL: 13 (ref 12–20)
CALCIUM SERPL-MCNC: 8.8 MG/DL (ref 8.5–10.1)
CHLORIDE SERPL-SCNC: 108 MMOL/L (ref 100–111)
CO2 SERPL-SCNC: 30 MMOL/L (ref 21–32)
COLOR UR: YELLOW
CREAT SERPL-MCNC: 1.04 MG/DL (ref 0.6–1.3)
DIFFERENTIAL METHOD BLD: NORMAL
EOSINOPHIL # BLD: 0.4 K/UL (ref 0–0.4)
EOSINOPHIL NFR BLD: 4 % (ref 0–5)
ERYTHROCYTE [DISTWIDTH] IN BLOOD BY AUTOMATED COUNT: 13.6 % (ref 11.6–14.5)
GLOBULIN SER CALC-MCNC: 4 G/DL (ref 2–4)
GLUCOSE SERPL-MCNC: 135 MG/DL (ref 74–99)
GLUCOSE UR STRIP.AUTO-MCNC: NEGATIVE MG/DL
HCT VFR BLD AUTO: 44.9 % (ref 36–48)
HGB BLD-MCNC: 15.5 G/DL (ref 13–16)
HGB UR QL STRIP: NEGATIVE
KETONES UR QL STRIP.AUTO: NEGATIVE MG/DL
LEUKOCYTE ESTERASE UR QL STRIP.AUTO: NEGATIVE
LIPASE SERPL-CCNC: 85 U/L (ref 73–393)
LYMPHOCYTES # BLD: 2.1 K/UL (ref 0.9–3.6)
LYMPHOCYTES NFR BLD: 22 % (ref 21–52)
MAGNESIUM SERPL-MCNC: 2.3 MG/DL (ref 1.6–2.6)
MCH RBC QN AUTO: 30.9 PG (ref 24–34)
MCHC RBC AUTO-ENTMCNC: 34.5 G/DL (ref 31–37)
MCV RBC AUTO: 89.4 FL (ref 74–97)
MONOCYTES # BLD: 0.9 K/UL (ref 0.05–1.2)
MONOCYTES NFR BLD: 10 % (ref 3–10)
NEUTS SEG # BLD: 5.9 K/UL (ref 1.8–8)
NEUTS SEG NFR BLD: 64 % (ref 40–73)
NITRITE UR QL STRIP.AUTO: NEGATIVE
PH UR STRIP: 7.5 [PH] (ref 5–8)
PLATELET # BLD AUTO: 304 K/UL (ref 135–420)
PMV BLD AUTO: 9.9 FL (ref 9.2–11.8)
POTASSIUM SERPL-SCNC: 3.7 MMOL/L (ref 3.5–5.5)
PROT SERPL-MCNC: 7.8 G/DL (ref 6.4–8.2)
PROT UR STRIP-MCNC: NEGATIVE MG/DL
RBC # BLD AUTO: 5.02 M/UL (ref 4.7–5.5)
SODIUM SERPL-SCNC: 140 MMOL/L (ref 136–145)
SP GR UR REFRACTOMETRY: 1.02 (ref 1–1.03)
UROBILINOGEN UR QL STRIP.AUTO: 1 EU/DL (ref 0.2–1)
WBC # BLD AUTO: 9.3 K/UL (ref 4.6–13.2)

## 2020-12-17 PROCEDURE — 99284 EMERGENCY DEPT VISIT MOD MDM: CPT

## 2020-12-17 PROCEDURE — 80053 COMPREHEN METABOLIC PANEL: CPT

## 2020-12-17 PROCEDURE — 83690 ASSAY OF LIPASE: CPT

## 2020-12-17 PROCEDURE — 83735 ASSAY OF MAGNESIUM: CPT

## 2020-12-17 PROCEDURE — 85025 COMPLETE CBC W/AUTO DIFF WBC: CPT

## 2020-12-17 PROCEDURE — 74011250636 HC RX REV CODE- 250/636: Performed by: PHYSICIAN ASSISTANT

## 2020-12-17 PROCEDURE — 96360 HYDRATION IV INFUSION INIT: CPT

## 2020-12-17 PROCEDURE — 96361 HYDRATE IV INFUSION ADD-ON: CPT

## 2020-12-17 PROCEDURE — 81003 URINALYSIS AUTO W/O SCOPE: CPT

## 2020-12-17 RX ORDER — ONDANSETRON 4 MG/1
4 TABLET, ORALLY DISINTEGRATING ORAL
Qty: 20 TAB | Refills: 0 | Status: SHIPPED | OUTPATIENT
Start: 2020-12-17 | End: 2021-04-20

## 2020-12-17 RX ORDER — DICYCLOMINE HYDROCHLORIDE 10 MG/1
10 CAPSULE ORAL 3 TIMES DAILY
Qty: 20 CAP | Refills: 0 | Status: SHIPPED | OUTPATIENT
Start: 2020-12-17 | End: 2021-04-20

## 2020-12-17 RX ADMIN — SODIUM CHLORIDE 1000 ML: 900 INJECTION, SOLUTION INTRAVENOUS at 08:22

## 2020-12-17 RX ADMIN — SODIUM CHLORIDE 1000 ML: 900 INJECTION, SOLUTION INTRAVENOUS at 09:04

## 2020-12-17 NOTE — LETTER
[facility] 
[facility] EMERGENCY DEPT 
[address] [facility] [address] [phone] Work/School Note Date: 12/17/2020 To Whom It May concern: 
 
Dm Pappas was seen and treated today in the emergency room by the following provider(s): 
Attending Provider: Sissy Alas MD 
Physician Assistant: Valentina Song, [address] [address] may return to work on 12/21/20 Sincerely, STEPH Redding

## 2020-12-17 NOTE — ED TRIAGE NOTES
Pt reports several episodes of diarrhea over that last week which has slowed down, however pt is feeling weak.

## 2020-12-17 NOTE — DISCHARGE INSTRUCTIONS
Patient Education        Dehydration: Care Instructions  Your Care Instructions  Dehydration happens when your body loses too much fluid. This might happen when you do not drink enough water or you lose large amounts of fluids from your body because of diarrhea, vomiting, or sweating. Severe dehydration can be life-threatening. Water and minerals called electrolytes help put your body fluids back in balance. Learn the early signs of fluid loss, and drink more fluids to prevent dehydration. Follow-up care is a key part of your treatment and safety. Be sure to make and go to all appointments, and call your doctor if you are having problems. It's also a good idea to know your test results and keep a list of the medicines you take. How can you care for yourself at home? · To prevent dehydration, drink plenty of fluids, enough so that your urine is light yellow or clear like water. Choose water and other caffeine-free clear liquids until you feel better. If you have kidney, heart, or liver disease and have to limit fluids, talk with your doctor before you increase the amount of fluids you drink. · If you do not feel like eating or drinking, try taking small sips of water, sports drinks, or other rehydration drinks. · Get plenty of rest.  To prevent dehydration  · Add more fluids to your diet and daily routine, unless your doctor has told you not to. · During hot weather, drink more fluids. Drink even more fluids if you exercise a lot. Stay away from drinks with alcohol or caffeine. · Watch for the symptoms of dehydration. These include:  ? A dry, sticky mouth. ? Dark yellow urine, and not much of it. ? Dry and sunken eyes. ? Feeling very tired. · Learn what problems can lead to dehydration. These include:  ? Diarrhea, fever, and vomiting. ? Any illness with a fever, such as pneumonia or the flu. ?  Activities that cause heavy sweating, such as endurance races and heavy outdoor work in hot or humid weather. ? Alcohol or drug use or problems caused by quitting their use (withdrawal). ? Certain medicines, such as cold and allergy pills (antihistamines), diet pills (diuretics), and laxatives. ? Certain diseases, such as diabetes, cancer, and heart or kidney disease. When should you call for help? Call 911 anytime you think you may need emergency care. For example, call if:    · You passed out (lost consciousness). Call your doctor now or seek immediate medical care if:    · You are confused and cannot think clearly.     · You are dizzy or lightheaded, or you feel like you may faint.     · You have signs of needing more fluids. You have sunken eyes and a dry mouth, and you pass only a little dark urine.     · You cannot keep fluids down. Watch closely for changes in your health, and be sure to contact your doctor if:    · You are not making tears.     · Your skin is very dry and sags slowly back into place after you pinch it.     · Your mouth and eyes are very dry. Where can you learn more? Go to http://www.gray.com/  Enter Q814 in the search box to learn more about \"Dehydration: Care Instructions. \"  Current as of: June 26, 2019               Content Version: 12.6  © 5943-8097 [employer]. Care instructions adapted under license by [employer] (which disclaims liability or warranty for this information). If you have questions about a medical condition or this instruction, always ask your healthcare professional. [place] [address] any warranty or liability for your use of this information. Patient Education        Diarrhea: Care Instructions  Your Care Instructions     Diarrhea is loose, watery stools (bowel movements). The exact cause is often hard to find. Sometimes diarrhea is your body's way of getting rid of what caused an upset stomach. Viruses, food poisoning, and many medicines can cause diarrhea.  Some people get diarrhea in response to emotional stress, anxiety, or certain foods. Almost everyone has diarrhea now and then. It usually isn't serious, and your stools will return to normal soon. The important thing to do is replace the fluids you have lost, so you can prevent dehydration. The doctor has checked you carefully, but problems can develop later. If you notice any problems or new symptoms, get medical treatment right away. Follow-up care is a key part of your treatment and safety. Be sure to make and go to all appointments, and call your doctor if you are having problems. It's also a good idea to know your test results and keep a list of the medicines you take. How can you care for yourself at home? · Watch for signs of dehydration, which means your body has lost too much water. Dehydration is a serious condition and should be treated right away. Signs of dehydration are:  ? Increasing thirst and dry eyes and mouth. ? Feeling faint or lightheaded. ? A smaller amount of urine than normal.  · To prevent dehydration, drink plenty of fluids. Choose water and other caffeine-free clear liquids until you feel better. If you have kidney, heart, or liver disease and have to limit fluids, talk with your doctor before you increase the amount of fluids you drink. · Begin eating small amounts of mild foods the next day, if you feel like it. ? Try yogurt that has live cultures of Lactobacillus. (Check the label.)  ? Avoid spicy foods, fruits, alcohol, and caffeine until 48 hours after all symptoms are gone. ? Avoid chewing gum that contains sorbitol. ? Avoid dairy products (except for yogurt with Lactobacillus) while you have diarrhea and for 3 days after symptoms are gone. · The doctor may recommend that you take over-the-counter medicine, such as loperamide (Imodium), if you still have diarrhea after 6 hours. Read and follow all instructions on the label.  Do not use this medicine if you have bloody diarrhea, a high fever, or other signs of serious illness. Call your doctor if you think you are having a problem with your medicine. When should you call for help? Call 911 anytime you think you may need emergency care. For example, call if:    · You passed out (lost consciousness).     · Your stools are maroon or very bloody. Call your doctor now or seek immediate medical care if:    · You are dizzy or lightheaded, or you feel like you may faint.     · Your stools are black and look like tar, or they have streaks of blood.     · You have new or worse belly pain.     · You have symptoms of dehydration, such as:  ? Dry eyes and a dry mouth. ? Passing only a little dark urine. ? Feeling thirstier than usual.     · You have a new or higher fever. Watch closely for changes in your health, and be sure to contact your doctor if:    · Your diarrhea is getting worse.     · You see pus in the diarrhea.     · You are not getting better after 2 days (48 hours). Where can you learn more? Go to http://www.gray.com/  Enter [id number] in the search box to learn more about \"Diarrhea: Care Instructions. \"  Current as of: June 26, 2019               Content Version: 12.6  © 4739-2757 [employer]. Care instructions adapted under license by [employer] (which disclaims liability or warranty for this information). If you have questions about a medical condition or this instruction, always ask your healthcare professional. [place] [address] any warranty or liability for your use of this information.

## 2020-12-17 NOTE — ED PROVIDER NOTES
EMERGENCY DEPARTMENT HISTORY AND PHYSICAL EXAM    Date: 12/17/2020  Patient Name: Suzi Chawla    History of Presenting Illness     Chief Complaint   Patient presents with    Diarrhea         History Provided By: Patient    Chief Complaint: Diarrhea, dehydration, fatigue  Duration: Days  Timing: Improving  Location: Diffuse body  Quality: Fatigue  Severity: Moderate  Modifying Factors: None  Associated Symptoms: none       Additional History (Context): Suzi Chawla is a 64 y.o. male with a history of asthma who presents today for issues listed above. Patient reports that he has \"feel the best he has in a couple days\". Reports he is concerned for dehydration and will need a work note. Denies any known sick contacts or recent travel. Denies any recent gatherings. Denies any fevers, sore throat, chest pain, shortness of breath, abdominal pain, nausea or vomiting. Patient also states \"I think I have a GI bug\". PCP: None    Current Outpatient Medications   Medication Sig Dispense Refill    dicyclomine (BENTYL) 10 mg capsule Take 1 Cap by mouth three (3) times daily. 20 Cap 0    ondansetron (Zofran ODT) 4 mg disintegrating tablet 1 Tab by SubLINGual route every eight (8) hours as needed for Nausea or Vomiting. 20 Tab 0    albuterol (PROVENTIL HFA, VENTOLIN HFA, PROAIR HFA) 90 mcg/actuation inhaler Take 1 Puff by inhalation every four (4) hours as needed for Wheezing. 1 Inhaler 0    dextromethorphan-guaiFENesin (ROBITUSSIN-DM)  mg/5 mL syrup Take 10 mL by mouth every six (6) hours as needed for Cough. 240 mL 0    Camphor-Eucalyptus Oil-Menthol (VICKS VAPORUB) 4.8-1.2-2.6 % oint 1 Actuation(s) by Apply Externally route three (3) times daily as needed for Cough.  60 g 0    guaiFENesin-codeine (ROBITUSSIN AC) 100-10 mg/5 mL solution 5mL every 4 hours prn 120 mL 0    azithromycin (ZITHROMAX Z-ILIA) 250 mg tablet Take two tablets today then one tablet daily 6 Tab 0    methylPREDNISolone (MEDROL, ILIA,) 4 mg tablet Take as directed 1 Dose Pack 0    HYDROcodone-acetaminophen (NORCO) 5-325 mg per tablet Take 1 Tab by mouth every four (4) hours as needed for Pain. Max Daily Amount: 6 Tabs. 12 Tab 0       Past History     Past Medical History:  No past medical history on file. Past Surgical History:  No past surgical history on file. Family History:  No family history on file. Social History:  Social History     Tobacco Use    Smoking status: Current Every Day Smoker     Packs/day: 1.00   Substance Use Topics    Alcohol use: Yes     Comment: Ocassionally    Drug use: No       Allergies:  No Known Allergies      Review of Systems   Review of Systems   Constitutional: Positive for fatigue. Negative for chills and fever. HENT: Negative for congestion, rhinorrhea and sore throat. Respiratory: Negative for cough and shortness of breath. Cardiovascular: Negative for chest pain. Gastrointestinal: Positive for diarrhea. Negative for abdominal pain, blood in stool, constipation, nausea and vomiting. Genitourinary: Negative for dysuria, frequency and hematuria. Musculoskeletal: Negative for back pain and myalgias. Skin: Negative for rash and wound. Neurological: Negative for dizziness and headaches. All other systems reviewed and are negative. All Other Systems Negative  Physical Exam     Vitals:    12/17/20 0806 12/17/20 0943   BP: (!) 160/102 (!) 162/107   Pulse: (!) 115 70   Resp: 18 18   Temp: 98 °F (36.7 °C)    SpO2: 98% 100%   Weight: 74.8 kg (165 lb)      Physical Exam  Vitals signs and nursing note reviewed. Constitutional:       General: He is not in acute distress. Appearance: He is well-developed. He is not diaphoretic. Comments: Well appearing, nontoxic    HENT:      Head: Normocephalic and atraumatic. Eyes:      Conjunctiva/sclera: Conjunctivae normal.   Neck:      Musculoskeletal: Normal range of motion and neck supple.    Cardiovascular:      Rate and Rhythm: Normal rate and regular rhythm. Heart sounds: Normal heart sounds. Pulmonary:      Effort: Pulmonary effort is normal. No respiratory distress. Breath sounds: Normal breath sounds. Chest:      Chest wall: No tenderness. Abdominal:      General: Bowel sounds are normal. There is no distension. Palpations: Abdomen is soft. Tenderness: There is no abdominal tenderness. There is no guarding or rebound. Comments: Soft and non tender      Musculoskeletal: Normal range of motion. General: No deformity. Skin:     General: Skin is warm and dry. Neurological:      Mental Status: He is alert and oriented to person, place, and time. Diagnostic Study Results     Labs -     Recent Results (from the past 12 hour(s))   CBC WITH AUTOMATED DIFF    Collection Time: 12/17/20  8:13 AM   Result Value Ref Range    WBC 9.3 4.6 - 13.2 K/uL    RBC 5.02 4.70 - 5.50 M/uL    HGB 15.5 13.0 - 16.0 g/dL    HCT 44.9 36.0 - 48.0 %    MCV 89.4 74.0 - 97.0 FL    MCH 30.9 24.0 - 34.0 PG    MCHC 34.5 31.0 - 37.0 g/dL    RDW 13.6 11.6 - 14.5 %    PLATELET 138 479 - 464 K/uL    MPV 9.9 9.2 - 11.8 FL    NEUTROPHILS 64 40 - 73 %    LYMPHOCYTES 22 21 - 52 %    MONOCYTES 10 3 - 10 %    EOSINOPHILS 4 0 - 5 %    BASOPHILS 0 0 - 2 %    ABS. NEUTROPHILS 5.9 1.8 - 8.0 K/UL    ABS. LYMPHOCYTES 2.1 0.9 - 3.6 K/UL    ABS. MONOCYTES 0.9 0.05 - 1.2 K/UL    ABS. EOSINOPHILS 0.4 0.0 - 0.4 K/UL    ABS.  BASOPHILS 0.0 0.0 - 0.1 K/UL    DF AUTOMATED     METABOLIC PANEL, COMPREHENSIVE    Collection Time: 12/17/20  8:13 AM   Result Value Ref Range    Sodium 140 136 - 145 mmol/L    Potassium 3.7 3.5 - 5.5 mmol/L    Chloride 108 100 - 111 mmol/L    CO2 30 21 - 32 mmol/L    Anion gap 2 (L) 3.0 - 18 mmol/L    Glucose 135 (H) 74 - 99 mg/dL    BUN 14 7.0 - 18 MG/DL    Creatinine 1.04 0.6 - 1.3 MG/DL    BUN/Creatinine ratio 13 12 - 20      GFR est AA >60 >60 ml/min/1.73m2    GFR est non-AA >60 >60 ml/min/1.73m2    Calcium 8.8 8.5 - 10.1 MG/DL Bilirubin, total 1.1 (H) 0.2 - 1.0 MG/DL    ALT (SGPT) 38 16 - 61 U/L    AST (SGOT) 24 10 - 38 U/L    Alk. phosphatase 63 45 - 117 U/L    Protein, total 7.8 6.4 - 8.2 g/dL    Albumin 3.8 3.4 - 5.0 g/dL    Globulin 4.0 2.0 - 4.0 g/dL    A-G Ratio 1.0 0.8 - 1.7     LIPASE    Collection Time: 12/17/20  8:13 AM   Result Value Ref Range    Lipase 85 73 - 393 U/L   MAGNESIUM    Collection Time: 12/17/20  8:13 AM   Result Value Ref Range    Magnesium 2.3 1.6 - 2.6 mg/dL   URINALYSIS W/ RFLX MICROSCOPIC    Collection Time: 12/17/20  9:03 AM   Result Value Ref Range    Color YELLOW      Appearance CLOUDY      Specific gravity 1.017 1.005 - 1.030      pH (UA) 7.5 5.0 - 8.0      Protein Negative NEG mg/dL    Glucose Negative NEG mg/dL    Ketone Negative NEG mg/dL    Bilirubin Negative NEG      Blood Negative NEG      Urobilinogen 1.0 0.2 - 1.0 EU/dL    Nitrites Negative NEG      Leukocyte Esterase Negative NEG         Radiologic Studies -   No orders to display     CT Results  (Last 48 hours)    None        CXR Results  (Last 48 hours)    None            Medical Decision Making   I am the first provider for this patient. I reviewed the vital signs, available nursing notes, past medical history, past surgical history, family history and social history. Vital Signs-Reviewed the patient's vital signs. Records Reviewed: Nursing Notes and Old Medical Records     Procedures: None   Procedures    Provider Notes (Medical Decision Making):     Differential Diagnosis:  influenza, URI, gastroenteritis, COVID    Plan: Patient is well-appearing and nontoxic. Patient's heart rate is mildly elevated at 115, most likely secondary to dehydration. Patient reports his episodes of diarrhea have greatly improved and he is feeling much better today. We will plan to rehydrate and check basic labs. 9:14 AM  Labs are overall reassuring. Patient's heart rate has improved. Will discharge home with Bentyl and Zofran.   Have stressed the importance of hydration and rest.      9:45 AM  Patient's work-up was reassuring. Patient states he is feeling much better at this time, heart rate did improve to 70. Will discharge home  MED RECONCILIATION:  No current facility-administered medications for this encounter. Current Outpatient Medications   Medication Sig    dicyclomine (BENTYL) 10 mg capsule Take 1 Cap by mouth three (3) times daily.  ondansetron (Zofran ODT) 4 mg disintegrating tablet 1 Tab by SubLINGual route every eight (8) hours as needed for Nausea or Vomiting.  albuterol (PROVENTIL HFA, VENTOLIN HFA, PROAIR HFA) 90 mcg/actuation inhaler Take 1 Puff by inhalation every four (4) hours as needed for Wheezing.  dextromethorphan-guaiFENesin (ROBITUSSIN-DM)  mg/5 mL syrup Take 10 mL by mouth every six (6) hours as needed for Cough.  Camphor-Eucalyptus Oil-Menthol (VICKS VAPORUB) 4.8-1.2-2.6 % oint 1 Actuation(s) by Apply Externally route three (3) times daily as needed for Cough.  guaiFENesin-codeine (ROBITUSSIN AC) 100-10 mg/5 mL solution 5mL every 4 hours prn    azithromycin (ZITHROMAX Z-ILIA) 250 mg tablet Take two tablets today then one tablet daily    methylPREDNISolone (MEDROL, ILIA,) 4 mg tablet Take as directed    HYDROcodone-acetaminophen (NORCO) 5-325 mg per tablet Take 1 Tab by mouth every four (4) hours as needed for Pain. Max Daily Amount: 6 Tabs. Disposition:  Home     DISCHARGE NOTE:   Pt has been reexamined. Patient has no new complaints, changes, or physical findings. Care plan outlined and precautions discussed. Results of workup were reviewed with the patient. All medications were reviewed with the patient. All of pt's questions and concerns were addressed. Patient was instructed and agrees to follow up with PCP as well as to return to the ED upon further deterioration. Patient is ready to go home.     Follow-up Information     Follow up With Specialties Details Why Contact Info [facility] EMERGENCY DEPT Emergency Medicine  As needed [address] [facility] [id number]  [phone]    [address] [facility]  Schedule an appointment as soon as possible for a visit  [address]  [place] [address]          Current Discharge Medication List      START taking these medications    Details   dicyclomine (BENTYL) 10 mg capsule Take 1 Cap by mouth three (3) times daily. Qty: 20 Cap, Refills: 0      ondansetron (Zofran ODT) 4 mg disintegrating tablet 1 Tab by SubLINGual route every eight (8) hours as needed for Nausea or Vomiting. Qty: 20 Tab, Refills: 0                 Diagnosis     Clinical Impression:   1. Diarrhea, unspecified type    2. Dehydration          \"Please note that this dictation was completed with [employer], the computer voice recognition software. Quite often unanticipated grammatical, syntax, homophones, and other interpretive errors are inadvertently transcribed by the computer software. Please disregard these errors. Please excuse any errors that have escaped final proofreading. \"

## 2021-04-20 ENCOUNTER — HOSPITAL ENCOUNTER (EMERGENCY)
Age: 61
Discharge: HOME | End: 2021-04-20
Attending: EMERGENCY MEDICINE
Payer: MEDICAID

## 2021-04-20 VITALS
OXYGEN SATURATION: 100 % | HEIGHT: 68 IN | DIASTOLIC BLOOD PRESSURE: 86 MMHG | HEART RATE: 61 BPM | WEIGHT: 165 LBS | BODY MASS INDEX: 25.01 KG/M2 | TEMPERATURE: 97.9 F | RESPIRATION RATE: 17 BRPM | SYSTOLIC BLOOD PRESSURE: 134 MMHG

## 2021-04-20 DIAGNOSIS — R21 RASH: Primary | ICD-10-CM

## 2021-04-20 PROCEDURE — 99282 EMERGENCY DEPT VISIT SF MDM: CPT

## 2021-04-20 RX ORDER — TRIAMCINOLONE ACETONIDE 1 MG/ML
LOTION TOPICAL 3 TIMES DAILY
Qty: 60 ML | Refills: 0 | Status: SHIPPED | OUTPATIENT
Start: 2021-04-20 | End: 2021-06-01 | Stop reason: SDUPTHER

## 2021-04-20 RX ORDER — PERMETHRIN 50 MG/G
CREAM TOPICAL
Qty: 60 G | Refills: 1 | Status: SHIPPED | OUTPATIENT
Start: 2021-04-20 | End: 2021-05-20

## 2021-04-20 NOTE — ED TRIAGE NOTES
Patient states that has a rash that has spread from back- abdomen, arms and now legs- tried many over the OTC products getting worse- thought was shingles -states not painful itches

## 2021-04-21 NOTE — ED PROVIDER NOTES
[facility] EMERGENCY DEPT    Date: 4/20/2021  Patient Name: Berenice Swift    History of Presenting Illness     Chief Complaint   Patient presents with    Rash     63 y.o. male smoker with no significant PMH who presents to the ER c/o a rash. Patient states that he has had an extremely pruritic rash for the last 3 weeks that was initially affecting his trunk and has spread to his extremities. Rash does not affect his groin. He has used cortisone and moisturizing creams without improvement. Patient denies any other associated signs or symptoms. Patient denies any other complaints. Patient is a poor historian; appears to be hyperactive with pressured speech and is easily distracted. Nursing notes regarding the HPI and triage nursing notes were reviewed. Prior medical records were reviewed. Current Outpatient Medications   Medication Sig Dispense Refill    permethrin (ACTICIN) 5 % topical cream Apply to entire body, leave on 8-14 hours. May repeat in 14 days if live mites still present. 60 g 1    triamcinolone (KENALOG) 0.1 % lotion Apply  to affected area three (3) times daily. use thin layer to affected areas 60 mL 0    albuterol (PROVENTIL HFA, VENTOLIN HFA, PROAIR HFA) 90 mcg/actuation inhaler Take 1 Puff by inhalation every four (4) hours as needed for Wheezing. 1 Inhaler 0       Past History     Past Medical History:  None     Past Surgical History:  History reviewed. No pertinent surgical history. Family History:  History reviewed. No pertinent family history. Social History:  Social History     Tobacco Use    Smoking status: Current Every Day Smoker     Packs/day: 1.00   Substance Use Topics    Alcohol use: Yes     Comment: Ocassionally    Drug use: No       Allergies:  No Known Allergies    Patient's primary care provider (as noted in EPIC):  None    Review of Systems:   Constitutional:  Denies malaise, fever, chills. ENMT:  Denies throat swelling.    Neck: Denies injury or pain. Respiratory:  Denies shortness of breath. Neuro:  Denies headache, LOC, dizziness, neurologic symptoms/deficits/paresthesias. Skin: Denies injury. +rash, +itching, +skin changes  All other systems negative as reviewed. Visit Vitals  /86 (BP 1 Location: Left arm, BP Patient Position: At rest)   Pulse 61   Temp 97.9 °F (36.6 °C)   Resp 17   Ht 5' 8\" (1.727 m)   Wt 74.8 kg (165 lb)   SpO2 100%   BMI 25.09 kg/m²       PHYSICAL EXAM:    CONSTITUTIONAL:  Alert, hyperactive with pressured speech. Well nourished. HEAD:  Normocephalic, atraumatic. EYES:  EOMI. Non-icteric sclera. Normal conjunctiva. ENTM:  Nose:  no rhinorrhea. mucous membranes moist.  NECK:  Supple  RESPIRATORY:  Breathing unlabored   CARDIOVASCULAR:  Regular rate and rhythm. GI:  Abdomen non-distended. BACK:  Non-tender. UPPER EXT:  Normal inspection. LOWER EXT:  No edema, no calf tenderness. Distal pulses intact. NEURO:  Moves all four extremities, and grossly normal motor exam.  SKIN: Multiple erythematous papules and plaques of over abdomen, chest, back, and extremities with overlying excoriations and possible thread-like burrows. PSYCH:  Alert and normal affect. DIFFERENTIAL DIAGNOSES/ MEDICAL DECISION MAKING:  Scabies, dermatitis, vs other etiologies. Pt likely has scabies. Rx for permethrin and triamcinolone. He may f/u with dermatology and return for any acute worsening. Diagnosis:   1.  Rash      Disposition: Discharge    Follow-up Information     Follow up With Specialties Details Why [address] [facility] Dermatology  In 3 days  [address]  [address] ([facility]) [address] [phone]    [facility] EMERGENCY DEPT Emergency Medicine  If symptoms worsen [address] [address] [phone]          Discharge Medication List as of 4/20/2021  9:34 PM      START taking these medications    Details   permethrin (ACTICIN) 5 % topical cream Apply to entire body, leave on 8-14 hours. May repeat in 14 days if live mites still present., Normal, Disp-60 g, R-1      triamcinolone (KENALOG) 0.1 % lotion Apply  to affected area three (3) times daily.  use thin layer to affected areas, Normal, Disp-60 mL, R-0         CONTINUE these medications which have NOT CHANGED    Details   albuterol (PROVENTIL HFA, VENTOLIN HFA, PROAIR HFA) 90 mcg/actuation inhaler Take 1 Puff by inhalation every four (4) hours as needed for Wheezing., Print, Disp-1 Inhaler, R-0           STEPH Chandler

## 2021-06-01 ENCOUNTER — HOSPITAL ENCOUNTER (EMERGENCY)
Age: 61
Discharge: HOME | End: 2021-06-01
Attending: EMERGENCY MEDICINE
Payer: MEDICAID

## 2021-06-01 VITALS
OXYGEN SATURATION: 100 % | WEIGHT: 165 LBS | RESPIRATION RATE: 18 BRPM | TEMPERATURE: 97.9 F | DIASTOLIC BLOOD PRESSURE: 89 MMHG | HEART RATE: 62 BPM | BODY MASS INDEX: 25.01 KG/M2 | SYSTOLIC BLOOD PRESSURE: 149 MMHG | HEIGHT: 68 IN

## 2021-06-01 DIAGNOSIS — M54.50 CHRONIC LOW BACK PAIN WITHOUT SCIATICA, UNSPECIFIED BACK PAIN LATERALITY: ICD-10-CM

## 2021-06-01 DIAGNOSIS — G89.29 CHRONIC LOW BACK PAIN WITHOUT SCIATICA, UNSPECIFIED BACK PAIN LATERALITY: ICD-10-CM

## 2021-06-01 DIAGNOSIS — B86 SCABIES: Primary | ICD-10-CM

## 2021-06-01 PROCEDURE — 99281 EMR DPT VST MAYX REQ PHY/QHP: CPT

## 2021-06-01 RX ORDER — METAXALONE 800 MG/1
800 TABLET ORAL 4 TIMES DAILY
Qty: 20 TABLET | Refills: 0 | Status: SHIPPED | OUTPATIENT
Start: 2021-06-01 | End: 2021-06-06

## 2021-06-01 RX ORDER — PERMETHRIN 50 MG/G
CREAM TOPICAL
Qty: 60 G | Refills: 1 | Status: SHIPPED | OUTPATIENT
Start: 2021-06-01 | End: 2021-06-02

## 2021-06-01 RX ORDER — IBUPROFEN 800 MG/1
800 TABLET ORAL EVERY 8 HOURS
Qty: 15 TABLET | Refills: 0 | Status: SHIPPED | OUTPATIENT
Start: 2021-06-01 | End: 2021-06-06

## 2021-06-01 RX ORDER — TRIAMCINOLONE ACETONIDE 1 MG/ML
LOTION TOPICAL 3 TIMES DAILY
Qty: 60 ML | Refills: 1 | Status: SHIPPED | OUTPATIENT
Start: 2021-06-01

## 2021-06-01 NOTE — ED TRIAGE NOTES
Pt arrived from home for back pain and rash. Pt discharged from [facility] ED for scabies about one month ago. Pt reports rash from scabies improved on left side and now is on legs and buttocks.

## 2021-06-01 NOTE — LETTER
NOTIFICATION RETURN TO WORK / SCHOOL 
 
6/1/2021 2:13 PM 
 
Mr. Melvin Hill [address] [facility] [place] [address] [place] [id number] To Whom It May Concern: 
 
Melvin Hill is currently under the care of [facility] EM[facility]ENCY DEPT. He will return to work/school on: 6/2/21. If there are questions or concerns please have the patient contact our office.  
 
 
 
Sincerely, 
 
 
Yolanda Powell PA-C

## 2021-06-01 NOTE — ED PROVIDER NOTES
EMERGENCY DEPARTMENT HISTORY AND PHYSICAL EXAM    Date: 6/1/2021  Patient Name: Clara Luna    History of Presenting Illness     Chief Complaint   Patient presents with    Back Pain    Rash         History Provided By: Patient      Additional History (Context): Clara Luna is a 63 y.o. male with No significant past medical history who presents with request for continued treatment of his scabies. Says he is very pruritic rash. The same rash which was improving with the topical steroid cream prescribed by ED on last visit. Says he feels like he just needs another round of meds. He is also reporting chronic low back pain. Denies saddle anesthesia bowel incontinence urinary retention sciatica unintentional weight loss in the past 6 months fever rash on his back IVDU or direct trauma. PCP: None    Current Outpatient Medications   Medication Sig Dispense Refill    triamcinolone (KENALOG) 0.1 % lotion Apply  to affected area three (3) times daily. use thin layer to affected areas 60 mL 1    permethrin (ACTICIN) 5 % topical cream Apply and leave on for 8hrs before rinsing. 60 g 1    ibuprofen (MOTRIN) 800 mg tablet Take 1 Tablet by mouth every eight (8) hours for 5 days. 15 Tablet 0    metaxalone (Skelaxin) 800 mg tablet Take 1 Tablet by mouth four (4) times daily for 5 days. 20 Tablet 0    albuterol (PROVENTIL HFA, VENTOLIN HFA, PROAIR HFA) 90 mcg/actuation inhaler Take 1 Puff by inhalation every four (4) hours as needed for Wheezing. 1 Inhaler 0       Past History     Past Medical History:  No past medical history on file. Past Surgical History:  No past surgical history on file. Family History:  No family history on file.     Social History:  Social History     Tobacco Use    Smoking status: Current Every Day Smoker     Packs/day: 1.00   Substance Use Topics    Alcohol use: Yes     Comment: Ocassionally    Drug use: No       Allergies:  No Known Allergies      Review of Systems   Review of Systems Constitutional: Negative for unexpected weight change. HENT: Negative. Eyes: Negative. Respiratory: Negative. Cardiovascular: Negative. Gastrointestinal: Negative. Endocrine: Negative. Genitourinary: Negative. Negative for difficulty urinating. Musculoskeletal: Positive for back pain. Skin: Positive for rash. Allergic/Immunologic: Negative. Neurological: Negative for weakness and numbness. Hematological: Negative. Psychiatric/Behavioral: Negative. All Other Systems Negative  Physical Exam     Vitals:    06/01/21 1357   BP: (!) 149/89   Pulse: 62   Resp: 18   Temp: 97.9 °F (36.6 °C)   SpO2: 100%   Weight: 74.8 kg (165 lb)   Height: 5' 8\" (1.727 m)     Physical Exam  Vitals and nursing note reviewed. Constitutional:       General: He is not in acute distress. Appearance: He is well-developed. He is not ill-appearing, toxic-appearing or diaphoretic. HENT:      Head: Normocephalic and atraumatic. Neck:      Thyroid: No thyromegaly. Vascular: No carotid bruit. Trachea: No tracheal deviation. Cardiovascular:      Rate and Rhythm: Normal rate and regular rhythm. Heart sounds: Normal heart sounds. No murmur heard. No friction rub. No gallop. Pulmonary:      Effort: Pulmonary effort is normal. No respiratory distress. Breath sounds: Normal breath sounds. No stridor. No wheezing or rales. Chest:      Chest wall: No tenderness. Abdominal:      General: There is no distension. Palpations: Abdomen is soft. There is no mass. Tenderness: There is no abdominal tenderness. There is no guarding or rebound. Comments: No pulsatile mass palpated. Musculoskeletal:         General: Tenderness present. Normal range of motion. Cervical back: Normal range of motion and neck supple. Comments: Generalized lower back discomfort to palpation. No midline tenderness. Skin:     General: Skin is warm and dry.       Coloration: Skin is not pale.      Findings: Rash present. Comments: Numerous small oval erythematous maculopapular lesions unroofed with excoriations on bilateral upper extremities lower extremities and bilateral anterior abdominal wall sides. Neurological:      Mental Status: He is alert. Psychiatric:         Speech: Speech normal.         Behavior: Behavior normal.         Thought Content: Thought content normal.         Judgment: Judgment normal.            Diagnostic Study Results     Labs -   No results found for this or any previous visit (from the past 12 hour(s)). Radiologic Studies -   No orders to display     CT Results  (Last 48 hours)    None        CXR Results  (Last 48 hours)    None            Medical Decision Making   I am the first provider for this patient. I reviewed the vital signs, available nursing notes, past medical history, past surgical history, family history and social history. Vital Signs-Reviewed the patient's vital signs. Procedures:  Procedures    Provider Notes (Medical Decision Making): Refill steroid cream as well as dispense permethrin treat his back pain and follow-up with [facility]. MED RECONCILIATION:  No current facility-administered medications for this encounter. Current Outpatient Medications   Medication Sig    triamcinolone (KENALOG) 0.1 % lotion Apply  to affected area three (3) times daily. use thin layer to affected areas    permethrin (ACTICIN) 5 % topical cream Apply and leave on for 8hrs before rinsing.  ibuprofen (MOTRIN) 800 mg tablet Take 1 Tablet by mouth every eight (8) hours for 5 days.  metaxalone (Skelaxin) 800 mg tablet Take 1 Tablet by mouth four (4) times daily for 5 days.  albuterol (PROVENTIL HFA, VENTOLIN HFA, PROAIR HFA) 90 mcg/actuation inhaler Take 1 Puff by inhalation every four (4) hours as needed for Wheezing. Disposition:  home    DISCHARGE NOTE:   2:17 PM    Pt has been reexamined.   Patient has no new complaints, changes, or physical findings. Care plan outlined and precautions discussed. Results of exam were reviewed with the patient. All medications were reviewed with the patient; will d/c home with see below. All of pt's questions and concerns were addressed. Patient was instructed and agrees to follow up with PCP, as well as to return to the ED upon further deterioration. Patient is ready to go home. Follow-up Information     Follow up With Specialties Details Why [address]  Schedule an appointment as soon as possible for a visit in 2 days  [address] [address]  [phone]    [facility] Emergency Medicine  If symptoms worsen return immediately [address]  [phone]          Current Discharge Medication List      START taking these medications    Details   permethrin (ACTICIN) 5 % topical cream Apply and leave on for 8hrs before rinsing. Qty: 60 g, Refills: 1  Start date: 6/1/2021, End date: 6/2/2021      ibuprofen (MOTRIN) 800 mg tablet Take 1 Tablet by mouth every eight (8) hours for 5 days. Qty: 15 Tablet, Refills: 0  Start date: 6/1/2021, End date: 6/6/2021      metaxalone (Skelaxin) 800 mg tablet Take 1 Tablet by mouth four (4) times daily for 5 days. Qty: 20 Tablet, Refills: 0  Start date: 6/1/2021, End date: 6/6/2021         CONTINUE these medications which have CHANGED    Details   triamcinolone (KENALOG) 0.1 % lotion Apply  to affected area three (3) times daily. use thin layer to affected areas  Qty: 60 mL, Refills: 1  Start date: 6/1/2021               Diagnosis     Clinical Impression:   1. Scabies    2.  Chronic low back pain without sciatica, unspecified back pain laterality

## 2022-01-27 ENCOUNTER — HOSPITAL ENCOUNTER (EMERGENCY)
Age: 62
Discharge: HOME | End: 2022-01-27
Attending: EMERGENCY MEDICINE
Payer: MEDICAID

## 2022-01-27 ENCOUNTER — APPOINTMENT (OUTPATIENT)
Dept: GENERAL RADIOLOGY | Age: 62
End: 2022-01-27
Attending: PHYSICIAN ASSISTANT
Payer: MEDICAID

## 2022-01-27 VITALS
WEIGHT: 165 LBS | RESPIRATION RATE: 18 BRPM | BODY MASS INDEX: 25.01 KG/M2 | SYSTOLIC BLOOD PRESSURE: 182 MMHG | HEART RATE: 60 BPM | OXYGEN SATURATION: 98 % | HEIGHT: 68 IN | DIASTOLIC BLOOD PRESSURE: 91 MMHG | TEMPERATURE: 98 F

## 2022-01-27 DIAGNOSIS — S42.291A CLOSED FRACTURE OF HEAD OF RIGHT HUMERUS, INITIAL ENCOUNTER: ICD-10-CM

## 2022-01-27 DIAGNOSIS — B96.89 SUPERFICIAL BACTERIAL INFECTION OF SKIN: Primary | ICD-10-CM

## 2022-01-27 DIAGNOSIS — L08.9 SUPERFICIAL BACTERIAL INFECTION OF SKIN: Primary | ICD-10-CM

## 2022-01-27 DIAGNOSIS — S52.021A CLOSED FRACTURE OF OLECRANON PROCESS OF RIGHT ULNA, INITIAL ENCOUNTER: ICD-10-CM

## 2022-01-27 PROCEDURE — 73030 X-RAY EXAM OF SHOULDER: CPT

## 2022-01-27 PROCEDURE — 99281 EMR DPT VST MAYX REQ PHY/QHP: CPT

## 2022-01-27 PROCEDURE — 73080 X-RAY EXAM OF ELBOW: CPT

## 2022-01-27 RX ORDER — NAPROXEN 500 MG/1
500 TABLET ORAL 2 TIMES DAILY WITH MEALS
Qty: 20 TABLET | Refills: 0 | Status: SHIPPED | OUTPATIENT
Start: 2022-01-27

## 2022-01-27 RX ORDER — CEPHALEXIN 500 MG/1
500 CAPSULE ORAL 4 TIMES DAILY
Qty: 28 CAPSULE | Refills: 0 | Status: SHIPPED | OUTPATIENT
Start: 2022-01-27 | End: 2022-02-03

## 2022-01-27 RX ORDER — HYDROCODONE BITARTRATE AND ACETAMINOPHEN 5; 325 MG/1; MG/1
1 TABLET ORAL
Qty: 10 TABLET | Refills: 0 | Status: SHIPPED | OUTPATIENT
Start: 2022-01-27 | End: 2022-02-03

## 2022-01-27 NOTE — Clinical Note
[facility] EMERGENCY DEPT  [address] [address] [phone]    Work/School Note    Date: 1/27/2022    To Whom It May concern:    Tyrell Phipps was seen and treated today in the emergency room by the following provider(s):  Attending Provider: Zechariah Wayne MD  Nurse Practitioner: Dennie Helms, PA.      Tyrell Phipps is excused from work/school on 1/27/2022 through 1/29/2022. He is medically clear to return to work/school on 1/30/2022.          Sincerely,          STEPH Norman

## 2022-01-27 NOTE — ED TRIAGE NOTES
Pt ambulatory to triage without assistance. Pt reports ground level fall on the 5th of this month and reports right shoulder pain ever since.   Pain worsens at night Detail Level: Detailed Quality 110: Preventive Care And Screening: Influenza Immunization: Influenza immunization was not ordered or administered, reason not given

## 2022-01-27 NOTE — ED PROVIDER NOTES
EMERGENCY DEPARTMENT HISTORY AND PHYSICAL EXAM      Date: 1/27/2022  Patient Name: Mya Rogers    History of Presenting Illness     Chief Complaint   Patient presents with    Shoulder Pain       History Provided By: Patient    HPI: Mya Rogers, 63 y.o. male no significant PMHx presents ambulatory to the ED with cc of right shoulder and elbow pain. Pt reports falling in parking lot on 1/5, landing on right shoulder. Denies hitting head and LOC. Pt reports taking advil without relief of sx. Denies numbness/tingling, radiating pain, weakness. Patient right-handed. Patient reports increased pain with picking up items, with overhead movement and with sleeping. There are no other complaints, changes, or physical findings at this time. PCP: None    No current facility-administered medications on file prior to encounter. Current Outpatient Medications on File Prior to Encounter   Medication Sig Dispense Refill    triamcinolone (KENALOG) 0.1 % lotion Apply  to affected area three (3) times daily. use thin layer to affected areas 60 mL 1    albuterol (PROVENTIL HFA, VENTOLIN HFA, PROAIR HFA) 90 mcg/actuation inhaler Take 1 Puff by inhalation every four (4) hours as needed for Wheezing. 1 Inhaler 0       Past History     Past Medical History:  No past medical history on file. Past Surgical History:  No past surgical history on file. Family History:  No family history on file. Social History:  Social History     Tobacco Use    Smoking status: Current Every Day Smoker     Packs/day: 1.00    Smokeless tobacco: Not on file   Substance Use Topics    Alcohol use: Yes     Comment: Ocassionally    Drug use: No       Allergies:  No Known Allergies      Review of Systems   Review of Systems   Constitutional: Negative for chills and fever. HENT: Negative for facial swelling. Eyes: Negative for photophobia and visual disturbance. Respiratory: Negative for shortness of breath.     Cardiovascular: Negative for chest pain. Gastrointestinal: Negative for abdominal pain, nausea and vomiting. Genitourinary: Negative for flank pain. Musculoskeletal: Positive for arthralgias (right shoulder and elbow pain). Skin: Negative for color change, pallor, rash and wound. Neurological: Negative for dizziness, weakness, light-headedness and headaches. All other systems reviewed and are negative. Physical Exam   Physical Exam  Vitals and nursing note reviewed. Constitutional:       General: He is not in acute distress. Appearance: He is well-developed. Comments: Pt in NAD   HENT:      Head: Normocephalic and atraumatic. Eyes:      Conjunctiva/sclera: Conjunctivae normal.   Cardiovascular:      Rate and Rhythm: Normal rate and regular rhythm. Heart sounds: Normal heart sounds. Pulmonary:      Effort: Pulmonary effort is normal. No respiratory distress. Breath sounds: Normal breath sounds. Abdominal:      General: Bowel sounds are normal.      Palpations: Abdomen is soft. Tenderness: There is no abdominal tenderness. Musculoskeletal:         General: Normal range of motion. Right shoulder: Tenderness and bony tenderness present. Normal range of motion. Right elbow: Normal range of motion. Tenderness present. Comments: Radial pulses strong and equal b/l  Sensation equal and intact to upper extremities b/l  Strength 5/5 to upper extremities bilaterally   Skin:     General: Skin is warm. Findings: No rash. Comments: Two small superficial scabs to wrist and elbow with surrounding erythema. No induration, fluctuance or drainage    Neurological:      Mental Status: He is alert and oriented to person, place, and time. Cranial Nerves: No cranial nerve deficit. Psychiatric:         Behavior: Behavior normal.         Diagnostic Study Results     Labs -   No results found for this or any previous visit (from the past 12 hour(s)).     Radiologic Studies -   XR ELBOW RT MIN 3 V   Final Result      Soft tissue swelling overlying the olecranon with punctate ossific density which   may reflect tiny chip fracture. XR SHOULDER RT AP/LAT MIN 2 V   Final Result      Findings likely from impacted superolateral humeral head fracture. CT may be   beneficial for further evaluation. CT HUMERUS RT WO CONT    (Results Pending)     CT Results  (Last 48 hours)    None        CXR Results  (Last 48 hours)    None          Medical Decision Making   I am the first provider for this patient. I reviewed the vital signs, available nursing notes, past medical history, past surgical history, family history and social history. Vital Signs-Reviewed the patient's vital signs. Patient Vitals for the past 12 hrs:   Temp Pulse Resp BP SpO2   01/27/22 1503 98 °F (36.7 °C) 60 18 (!) 182/91 98 %     Records Reviewed: Nursing Notes and Old Medical Records    Provider Notes (Medical Decision Making):   DDx: Shoulder fracture vs sprain, RTC tear, Dislocation    63 y who presents with right shoulder and right elbow pain after accident that occurred on 1/5. On exam, TTP to right shoulder and right shoulder. NVI. Xray shows impacted fracture to humerus with small possible chip fracture to elbow. Spoke with orthopedics who recommended outpatient CT scan, sling and Ortho follow-up. Also Keflex sent for small superficial infection surrounding scab to left arm. At time of discharge, pt non-toxic appearing in NAD. Pt stable for prompt outpatient follow-up with PCP 1 to 2 days. Patient given strict instructions to return if symptoms worsen. ED Course:   Initial assessment performed. The patients presenting problems have been discussed, and they are in agreement with the care plan formulated and outlined with them. I have encouraged them to ask questions as they arise throughout their visit. 1640: Spoke with Dr Tonie Potter, consult ortho.  He reviewed images and recommends sling for pain control, outpatient follow-up with Ortho and outpatient CT scan of humerus. Offered patient CT scan while inpatient and he requested outpatient CT scan because he has to  his son. Disposition:  4:41 PM  Discussed imaging results with pt along with dx and treatment plan. Discussed importance of PCP follow up. All questions answered. Pt voiced they understood. Return if sx worsen. PLAN:  1. Current Discharge Medication List      START taking these medications    Details   cephALEXin (Keflex) 500 mg capsule Take 1 Capsule by mouth four (4) times daily for 7 days. Qty: 28 Capsule, Refills: 0  Start date: 1/27/2022, End date: 2/3/2022      naproxen (NAPROSYN) 500 mg tablet Take 1 Tablet by mouth two (2) times daily (with meals). Qty: 20 Tablet, Refills: 0  Start date: 1/27/2022      HYDROcodone-acetaminophen (Norco) 5-325 mg per tablet Take 1 Tablet by mouth every six (6) hours as needed for Pain for up to 7 days. Max Daily Amount: 4 Tablets. Qty: 10 Tablet, Refills: 0  Start date: 1/27/2022, End date: 2/3/2022    Associated Diagnoses: Closed fracture of head of right humerus, initial encounter; Closed fracture of olecranon process of right ulna, initial encounter           2.    Follow-up Information     Follow up With Specialties Details Why [address]  Schedule an appointment as soon as possible for a visit in 1 day  [address]  [facility] [address]  Schedule an appointment as soon as possible for a visit in 1 day  [address] [facility] EMERGENCY DEPT Emergency Medicine  As needed, If symptoms worsen [address] [facility]    Scarbro MD [facility] Orthopedic Surgery Schedule an appointment as soon as possible for a visit in 1 day  [address]      Anuradha Chisholm MD Orthopedic Surgery Schedule an appointment as soon as possible for a visit in 1 day  [address] [place] [address]  [phone]          Return to ED if worse     Diagnosis     Clinical Impression:   1. Superficial bacterial infection of skin    2. Closed fracture of head of right humerus, initial encounter    3. Closed fracture of olecranon process of right ulna, initial encounter        Attestations:    STEPH Forbes    Please note that this dictation was completed with iZettle, the computer voice recognition software. Quite often unanticipated grammatical, syntax, homophones, and other interpretive errors are inadvertently transcribed by the computer software. Please disregard these errors. Please excuse any errors that have escaped final proofreading. Thank you.

## 2022-03-08 ENCOUNTER — OFFICE VISIT (OUTPATIENT)
Dept: ORTHOPEDIC SURGERY | Age: 62
End: 2022-03-08
Payer: MEDICAID

## 2022-03-08 VITALS
OXYGEN SATURATION: 96 % | TEMPERATURE: 97.4 F | HEART RATE: 78 BPM | WEIGHT: 183 LBS | HEIGHT: 68 IN | BODY MASS INDEX: 27.74 KG/M2

## 2022-03-08 DIAGNOSIS — S46.011A TRAUMATIC TEAR OF RIGHT ROTATOR CUFF, UNSPECIFIED TEAR EXTENT, INITIAL ENCOUNTER: Primary | ICD-10-CM

## 2022-03-08 DIAGNOSIS — M25.511 ACUTE PAIN OF RIGHT SHOULDER: ICD-10-CM

## 2022-03-08 PROCEDURE — 73030 X-RAY EXAM OF SHOULDER: CPT | Performed by: ORTHOPAEDIC SURGERY

## 2022-03-08 PROCEDURE — 99204 OFFICE O/P NEW MOD 45 MIN: CPT | Performed by: ORTHOPAEDIC SURGERY

## 2022-03-08 RX ORDER — MELOXICAM 15 MG/1
15 TABLET ORAL DAILY
Qty: 60 TABLET | Refills: 2 | Status: SHIPPED | OUTPATIENT
Start: 2022-03-08

## 2022-03-08 NOTE — PROGRESS NOTES
Patient: Karoline Redd                MRN: [id number]       SSN: [id number]  YOB: 1960        AGE: 63 y.o. SEX: male  Body mass index is 27.83 kg/m². PCP: None  03/08/22    CHIEF COMPLAINT: Right shoulder pain 5/10    HPI: Karoline Redd is a 63 y.o. male patient who presents to the office today with approximate 6 weeks of right shoulder pain. He injured his right shoulder in January. He was using a virtual reality headset when he fell landing on his right shoulder. He felt immediate pain and had difficulty with use of the arm. That has gotten better. He is regained some of his strength. He was seen in the emergency room a few weeks after the injury. He says he has worsening pain at night and difficulty with sleeping. History reviewed. No pertinent past medical history. History reviewed. No pertinent family history. Current Outpatient Medications   Medication Sig Dispense Refill    naproxen (NAPROSYN) 500 mg tablet Take 1 Tablet by mouth two (2) times daily (with meals). (Patient not taking: Reported on 3/8/2022) 20 Tablet 0    triamcinolone (KENALOG) 0.1 % lotion Apply  to affected area three (3) times daily. use thin layer to affected areas (Patient not taking: Reported on 3/8/2022) 60 mL 1    albuterol (PROVENTIL HFA, VENTOLIN HFA, PROAIR HFA) 90 mcg/actuation inhaler Take 1 Puff by inhalation every four (4) hours as needed for Wheezing. (Patient not taking: Reported on 3/8/2022) 1 Inhaler 0       No Known Allergies    History reviewed. No pertinent surgical history.     Social History     Socioeconomic History    Marital status: SINGLE     Spouse name: Not on file    Number of children: Not on file    Years of education: Not on file    Highest education level: Not on file   Occupational History    Not on file   Tobacco Use    Smoking status: Current Every Day Smoker     Packs/day: 1.00    Smokeless tobacco: Not on file   Substance and Sexual Activity    Alcohol use: Yes     Comment: Ocassionally    Drug use: No    Sexual activity: Yes     Partners: Female   Other Topics Concern    Not on file   Social History Narrative    Not on file     Social Determinants of Health     Financial Resource Strain:     Difficulty of Paying Living Expenses: Not on file   Food Insecurity:     Worried About Running Out of Food in the Last Year: Not on file    Jose of Food in the Last Year: Not on file   Transportation Needs:     Lack of Transportation (Medical): Not on file    Lack of Transportation (Non-Medical): Not on file   Physical Activity:     Days of Exercise per Week: Not on file    Minutes of Exercise per Session: Not on file   Stress:     Feeling of Stress : Not on file   Social Connections:     Frequency of Communication with Friends and Family: Not on file    Frequency of Social Gatherings with Friends and Family: Not on file    Attends [employer] Services: Not on file    Active Member of [address] [place] [address] or Organizations: Not on file    Attends Club or Organization Meetings: Not on file    Marital Status: Not on file   Intimate Partner Violence:     Fear of Current or Ex-Partner: Not on file    Emotionally Abused: Not on file    Physically Abused: Not on file    Sexually Abused: Not on file   Housing Stability:     Unable to Pay for Housing in the Last Year: Not on file    Number of [place] in the Last Year: Not on file    Unstable Housing in the Last Year: Not on file       REVIEW OF SYSTEMS:    14 point review of systems on the intake form is negative except as noted in the HPI    PHYSICAL EXAMINATION:  Visit Vitals  Pulse 78   Temp 97.4 °F (36.3 °C) (Temporal)   Ht 5' 8\" (1.727 m)   Wt 183 lb (83 kg)   SpO2 96%   BMI 27.83 kg/m²     Body mass index is 27.83 kg/m². GENERAL: Alert and oriented x3, in no acute distress, well-developed, well-nourished. HEENT: Normocephalic, atraumatic.     Shoulder Examination     R   L  ROM   FF  Full   Full  ER  Full   Full   IR  Full   Full  Rotator Cuff Pain   Supra  +   -   Infra  +   -   Subscap -   -  Crepitus  -   -  Effusion  -   -  Warmth  -   -   Erythema  -   -  Instability  -   -  AC Joint TTP  -   -  Clavicle   Deformity -   -   TTP  -   -  Proximal Humerus   Deformity -   -   TTP  -   -  Deltoid Strength 5   5  Biceps Strength 5   5  Biceps Deformity -   -  Biceps Groove Pain -   -  Impingement Sign -   -       IMAGING:  X-rays from the emergency room visit were reviewed in the office today. My interpretation is x-rays his an avulsion type injury to the greater tuberosity with minimal displacement. X-rays 4 views the right shoulder were taken in the office today. My interpretation of these x-rays is healing avulsion fracture type injury of the greater tuberosity with possible rotator cuff injury. No significant displacement is noted. ASSESSMENT & PLAN  Diagnosis: Right shoulder rotator cuff injury    Brian Carpio has a traumatic right shoulder injury likely affecting his rotator cuff with slight avulsion of the greater tuberosity. I discussed this with him at length today. In order to get a better view of the rotator cuff I recommended considering an MRI which she would like to hold off on for now. I would like to treat this as a rotator cuff injury with a home exercise program and an oral anti-inflammatory medication which was prescribed today. I would like to see him back in 6 weeks if his symptoms persist and we discussed either an MRI or an injection at that time. Prescription medication management discussed. Electronically signed by: Milo Robert MD    Note: This note was completed using voice recognition software.   Any typographical/name errors or mistakes are unintentional.

## 2023-02-15 RX ORDER — TRIAMCINOLONE ACETONIDE 1 MG/ML
LOTION TOPICAL 3 TIMES DAILY
COMMUNITY
Start: 2021-06-01

## 2023-02-15 RX ORDER — NAPROXEN 500 MG/1
500 TABLET ORAL 2 TIMES DAILY WITH MEALS
COMMUNITY
Start: 2022-01-27

## 2023-02-15 RX ORDER — ALBUTEROL SULFATE 90 UG/1
1 AEROSOL, METERED RESPIRATORY (INHALATION) EVERY 4 HOURS PRN
COMMUNITY
Start: 2019-02-27

## 2023-02-15 RX ORDER — MELOXICAM 15 MG/1
15 TABLET ORAL DAILY
COMMUNITY
Start: 2022-03-08

## 2023-05-13 ENCOUNTER — APPOINTMENT (OUTPATIENT)
Facility: HOSPITAL | Age: 63
End: 2023-05-13
Payer: COMMERCIAL

## 2023-05-13 ENCOUNTER — HOSPITAL ENCOUNTER (EMERGENCY)
Facility: HOSPITAL | Age: 63
Discharge: HOME | End: 2023-05-13
Attending: EMERGENCY MEDICINE
Payer: COMMERCIAL

## 2023-05-13 VITALS
WEIGHT: 170 LBS | SYSTOLIC BLOOD PRESSURE: 141 MMHG | DIASTOLIC BLOOD PRESSURE: 94 MMHG | OXYGEN SATURATION: 96 % | BODY MASS INDEX: 25.85 KG/M2 | RESPIRATION RATE: 16 BRPM | HEART RATE: 95 BPM | TEMPERATURE: 98 F

## 2023-05-13 DIAGNOSIS — S62.102A CLOSED FRACTURE OF LEFT WRIST, INITIAL ENCOUNTER: Primary | ICD-10-CM

## 2023-05-13 PROCEDURE — 73110 X-RAY EXAM OF WRIST: CPT

## 2023-05-13 PROCEDURE — 6370000000 HC RX 637 (ALT 250 FOR IP): Performed by: EMERGENCY MEDICINE

## 2023-05-13 PROCEDURE — 29125 APPL SHORT ARM SPLINT STATIC: CPT

## 2023-05-13 PROCEDURE — 2500000003 HC RX 250 WO HCPCS

## 2023-05-13 PROCEDURE — 99283 EMERGENCY DEPT VISIT LOW MDM: CPT

## 2023-05-13 RX ORDER — LIDOCAINE HYDROCHLORIDE 20 MG/ML
INJECTION, SOLUTION INFILTRATION; PERINEURAL
Status: COMPLETED
Start: 2023-05-13 | End: 2023-05-13

## 2023-05-13 RX ORDER — IBUPROFEN 400 MG/1
400 TABLET ORAL
Status: COMPLETED | OUTPATIENT
Start: 2023-05-13 | End: 2023-05-13

## 2023-05-13 RX ORDER — HYDROCODONE BITARTRATE AND ACETAMINOPHEN 5; 325 MG/1; MG/1
1 TABLET ORAL EVERY 4 HOURS PRN
Qty: 18 TABLET | Refills: 0 | Status: SHIPPED | OUTPATIENT
Start: 2023-05-13 | End: 2023-05-16

## 2023-05-13 RX ORDER — LIDOCAINE HYDROCHLORIDE 20 MG/ML
5 INJECTION, SOLUTION INFILTRATION; PERINEURAL
Status: COMPLETED | OUTPATIENT
Start: 2023-05-13 | End: 2023-05-13

## 2023-05-13 RX ORDER — OXYCODONE HYDROCHLORIDE AND ACETAMINOPHEN 5; 325 MG/1; MG/1
1 TABLET ORAL
Status: COMPLETED | OUTPATIENT
Start: 2023-05-13 | End: 2023-05-13

## 2023-05-13 RX ADMIN — IBUPROFEN 400 MG: 400 TABLET, FILM COATED ORAL at 03:21

## 2023-05-13 RX ADMIN — LIDOCAINE HYDROCHLORIDE 5 ML: 20 INJECTION, SOLUTION INFILTRATION; PERINEURAL at 03:17

## 2023-05-13 RX ADMIN — OXYCODONE HYDROCHLORIDE AND ACETAMINOPHEN 1 TABLET: 5; 325 TABLET ORAL at 03:21

## 2023-05-13 ASSESSMENT — PAIN DESCRIPTION - ORIENTATION: ORIENTATION: LEFT

## 2023-05-13 ASSESSMENT — ENCOUNTER SYMPTOMS
RESPIRATORY NEGATIVE: 1
GASTROINTESTINAL NEGATIVE: 1

## 2023-05-13 ASSESSMENT — PAIN SCALES - GENERAL
PAINLEVEL_OUTOF10: 8
PAINLEVEL_OUTOF10: 10
PAINLEVEL_OUTOF10: 6

## 2023-05-13 ASSESSMENT — PAIN - FUNCTIONAL ASSESSMENT: PAIN_FUNCTIONAL_ASSESSMENT: 0-10

## 2023-05-13 ASSESSMENT — PAIN DESCRIPTION - LOCATION
LOCATION: WRIST
LOCATION: WRIST

## 2023-05-13 NOTE — ED NOTES
Left wrist splinted and wrapped per Dr Shelli Engel' orders. Pt states the pain is relieved with splint and positioning.        Jeet Cote  05/13/23 5153

## 2023-05-13 NOTE — ED TRIAGE NOTES
Pt reports pain and swelling to left wrist after suffering a fall and landing on left wrist 30 min pta.

## 2023-05-13 NOTE — ED PROVIDER NOTES
improvement. Patient's pain is nearly 0. We will have him follow-up with Ortho on-call. Differential diagnosis fracture dislocation musculoskeletal pain    Amount and/or Complexity of Data Reviewed  Radiology: ordered. Risk  Prescription drug management. REASSESSMENT          CRITICAL CARE TIME   Total Critical Care time was  minutes, excluding separately reportable procedures. There was a high probability of clinically significant/life threatening deterioration in the patient's condition which required my urgent intervention. CONSULTS:  None    PROCEDURES:  Unless otherwise noted below, none     Procedures        FINAL IMPRESSION      1. Closed fracture of left wrist, initial encounter          DISPOSITION/PLAN   DISPOSITION Decision To Discharge 05/13/2023 04:36:06 AM      PATIENT REFERRED TO:  Benja Malave MD  [address] [address] 11 16    Call in 2 days        DISCHARGE MEDICATIONS:  New Prescriptions    HYDROCODONE-ACETAMINOPHEN (NORCO) 5-325 MG PER TABLET    Take 1 tablet by mouth every 4 hours as needed for Pain for up to 3 days. Intended supply: 3 days. Take lowest dose possible to manage pain Max Daily Amount: 6 tablets     Controlled Substances Monitoring:     No flowsheet data found. (Please note that portions of this note were completed with a voice recognition program.  Efforts were made to edit the dictations but occasionally words are mis-transcribed. )    Quintin Bauer MD (electronically signed)  Attending Emergency Physician           Jer Bailey MD  05/13/23 9663

## 2023-05-13 NOTE — DISCHARGE INSTRUCTIONS
Back if you get worse. Follow-up without fail. Please call on Monday for follow-up appointment. Take 400 of Motrin every 4-6 hours and Vicodin for breakthrough pain.

## 2023-05-17 ENCOUNTER — OFFICE VISIT (OUTPATIENT)
Age: 63
End: 2023-05-17

## 2023-05-17 VITALS — HEIGHT: 68 IN | WEIGHT: 179 LBS | TEMPERATURE: 97 F | BODY MASS INDEX: 27.13 KG/M2

## 2023-05-17 DIAGNOSIS — Z01.818 PREOP EXAMINATION: Primary | ICD-10-CM

## 2023-05-17 DIAGNOSIS — S62.022A: Primary | ICD-10-CM

## 2023-05-17 DIAGNOSIS — S52.552A OTHER CLOSED EXTRA-ARTICULAR FRACTURE OF DISTAL END OF LEFT RADIUS, INITIAL ENCOUNTER: ICD-10-CM

## 2023-05-17 PROBLEM — S52.502A CLOSED FRACTURE OF LEFT DISTAL RADIUS: Status: ACTIVE | Noted: 2023-05-17

## 2023-05-17 RX ORDER — SODIUM CHLORIDE 9 MG/ML
INJECTION, SOLUTION INTRAVENOUS PRN
OUTPATIENT
Start: 2023-05-17

## 2023-05-17 RX ORDER — SODIUM CHLORIDE 0.9 % (FLUSH) 0.9 %
5-40 SYRINGE (ML) INJECTION EVERY 12 HOURS SCHEDULED
OUTPATIENT
Start: 2023-05-17

## 2023-05-17 RX ORDER — SODIUM CHLORIDE 0.9 % (FLUSH) 0.9 %
5-40 SYRINGE (ML) INJECTION PRN
OUTPATIENT
Start: 2023-05-17

## 2023-05-17 NOTE — PROGRESS NOTES
reasonable alternatives including postponing the procedure were discussed. The patient does wish to proceed with the procedure at this time. 45 minutes was spent reviewing past pertinent history, discussing operative versus nonoperative treatment, postoperative convalescence, and answering all questions. Return for postop. Plan was reviewed with patient, who verbalized agreement and understanding of the plan    Note: This note was completed using voice recognition software.   Any typographical/name errors or mistakes are unintentional.

## 2023-05-17 NOTE — H&P
Zoe Starr is a 66 y.o. male right handed employed. Worker's Compensation and legal considerations: none    Chief Complaint   Patient presents with    Wrist Pain     Left wrist pain     Pain Score:   5    HPI: Patient presents today with a history of fall onto his left wrist.  He was diagnosed with a distal radius fracture as well as radiographic findings by radiologist consistent with a scaphoid fracture. Date of onset: 5/13/2023  Injury: Yes: Comment: Fall  Prior Treatment:  Yes: Comment: ED splint    ROS: Review of Systems - General ROS: negative except HPI    History reviewed. No pertinent past medical history. Past Surgical History:   Procedure Laterality Date    MULTIPLE TOOTH EXTRACTIONS          Current Outpatient Medications   Medication Sig Dispense Refill    HYDROcodone-acetaminophen (NORCO) 5-325 MG per tablet Take 1 tablet by mouth every 6 hours as needed for Pain. Max Daily Amount: 4 tablets      HYDROcodone-acetaminophen (NORCO) 5-325 MG per tablet Take 1 tablet by mouth every 6 hours as needed for Pain for up to 5 days. Intended supply: 5 days. Take lowest dose possible to manage pain Max Daily Amount: 4 tablets 20 tablet 0     No current facility-administered medications for this visit. No Known Allergies      BP (!) 0/0 Comment: unable to access  Temp 97 °F (36.1 °C) (Temporal)   Ht 5' 8\" (1.727 m)   Wt 179 lb (81.2 kg)   BMI 27.22 kg/m²   Physical Exam  Vitals and nursing note reviewed. Constitutional:       General: He is not in acute distress. Appearance: Normal appearance. He is normal weight. He is not ill-appearing, toxic-appearing or diaphoretic. HENT:      Head: Normocephalic and atraumatic. Nose: Nose normal.      Mouth/Throat:      Mouth: Mucous membranes are moist.   Eyes:      Extraocular Movements: Extraocular movements intact. Pupils: Pupils are equal, round, and reactive to light. Cardiovascular:      Pulses: Normal pulses.    Pulmonary:

## 2023-05-18 ENCOUNTER — TELEPHONE (OUTPATIENT)
Age: 63
End: 2023-05-18

## 2023-05-18 DIAGNOSIS — S52.552A OTHER CLOSED EXTRA-ARTICULAR FRACTURE OF DISTAL END OF LEFT RADIUS, INITIAL ENCOUNTER: ICD-10-CM

## 2023-05-18 DIAGNOSIS — S62.022A: Primary | ICD-10-CM

## 2023-05-18 RX ORDER — HYDROCODONE BITARTRATE AND ACETAMINOPHEN 5; 325 MG/1; MG/1
1 TABLET ORAL EVERY 6 HOURS PRN
Qty: 20 TABLET | Refills: 0 | Status: SHIPPED | OUTPATIENT
Start: 2023-05-18 | End: 2023-05-23

## 2023-05-18 NOTE — TELEPHONE ENCOUNTER
Patient states he was supposed to have pain medication called in after yesterday's appt. Please send to pharmacy in chart,  [address] on [facility].

## 2023-05-30 ENCOUNTER — PREP FOR PROCEDURE (OUTPATIENT)
Age: 63
End: 2023-05-30

## 2023-06-07 ENCOUNTER — OFFICE VISIT (OUTPATIENT)
Age: 63
End: 2023-06-07

## 2023-06-07 VITALS
HEIGHT: 68 IN | RESPIRATION RATE: 18 BRPM | BODY MASS INDEX: 27.16 KG/M2 | WEIGHT: 179.2 LBS | DIASTOLIC BLOOD PRESSURE: 103 MMHG | SYSTOLIC BLOOD PRESSURE: 168 MMHG

## 2023-06-07 DIAGNOSIS — S62.022D: Primary | ICD-10-CM

## 2023-06-07 DIAGNOSIS — G56.02 LEFT CARPAL TUNNEL SYNDROME: ICD-10-CM

## 2023-06-07 DIAGNOSIS — S52.552D OTHER CLOSED EXTRA-ARTICULAR FRACTURE OF DISTAL END OF LEFT RADIUS WITH ROUTINE HEALING, SUBSEQUENT ENCOUNTER: ICD-10-CM

## 2023-06-07 RX ORDER — SODIUM CHLORIDE 9 MG/ML
INJECTION, SOLUTION INTRAVENOUS PRN
OUTPATIENT
Start: 2023-06-07

## 2023-06-07 RX ORDER — SODIUM CHLORIDE 0.9 % (FLUSH) 0.9 %
5-40 SYRINGE (ML) INJECTION PRN
OUTPATIENT
Start: 2023-06-07

## 2023-06-07 RX ORDER — SODIUM CHLORIDE 0.9 % (FLUSH) 0.9 %
5-40 SYRINGE (ML) INJECTION EVERY 12 HOURS SCHEDULED
OUTPATIENT
Start: 2023-06-07

## 2023-06-07 RX ORDER — HYDROCODONE BITARTRATE AND ACETAMINOPHEN 5; 325 MG/1; MG/1
1 TABLET ORAL
Qty: 5 TABLET | Refills: 0 | Status: SHIPPED | OUTPATIENT
Start: 2023-06-07 | End: 2023-06-12

## 2023-06-07 NOTE — H&P
Liliane Joyner is a 66 y.o. male right handed employed. Worker's Compensation and legal considerations: none    Chief Complaint   Patient presents with    Wrist Pain     Left wrist     Pain Score:   6    HPI: Patient presents today for follow-up of left distal radius and scaphoid fracture. He was supposed to have his surgery a few weeks ago but had to cancel secondary to illness. This was the second cancellation as the first was due to not been able to get his labs done prior to surgery. He is currently tentatively scheduled for next week. He does report one new problem of persistent numbness and tingling in the thumb index and middle fingers. Initial HPI: Patient presents today with a history of fall onto his left wrist.  He was diagnosed with a distal radius fracture as well as radiographic findings by radiologist consistent with a scaphoid fracture. Date of onset: 5/13/2023  Injury: Yes: Comment: Fall  Prior Treatment:  Yes: Comment: ED splint    ROS: Review of Systems - General ROS: negative except HPI    History reviewed. No pertinent past medical history. Past Surgical History:   Procedure Laterality Date    MULTIPLE TOOTH EXTRACTIONS          Current Outpatient Medications   Medication Sig Dispense Refill    HYDROcodone-acetaminophen (NORCO) 5-325 MG per tablet Take 1 tablet by mouth nightly for 5 days. Intended supply: 5 days. Take lowest dose possible to manage pain Max Daily Amount: 1 tablet 5 tablet 0    HYDROcodone-acetaminophen (NORCO) 5-325 MG per tablet Take 1 tablet by mouth every 6 hours as needed for Pain. No current facility-administered medications for this visit. No Known Allergies      BP (!) 168/103 (Site: Right Upper Arm, Position: Sitting, Cuff Size: Small Adult)   Resp 18   Ht 5' 8\" (1.727 m)   Wt 179 lb 3.2 oz (81.3 kg)   BMI 27.25 kg/m²   Physical Exam  Vitals and nursing note reviewed. Constitutional:       General: He is not in acute distress.

## 2023-06-07 NOTE — PROGRESS NOTES
Closed comminuted fracture of waist of scaphoid, left, with routine healing, subsequent encounter  [39980] Wrist 3V    HYDROcodone-acetaminophen (NORCO) 5-325 MG per tablet      2. Other closed extra-articular fracture of distal end of left radius with routine healing, subsequent encounter  [15248] Wrist 3V    HYDROcodone-acetaminophen (NORCO) 5-325 MG per tablet      3. Left carpal tunnel syndrome  HYDROcodone-acetaminophen (NORCO) 5-325 MG per tablet            Plan:     Plan for left distal radius open reduction internal fixation, left scaphoid open reduction internal fixation, and possible left carpal tunnel release next week. Emphasized importance of quitting smoking to better assist in healing given the tenuous nature of vascularity to the scaphoid as well as the importance of getting his preoperative labs done. Return in 3 weeks (on 6/28/2023) for postop. Plan was reviewed with patient, who verbalized agreement and understanding of the plan    Note: This note was completed using voice recognition software.   Any typographical/name errors or mistakes are unintentional.

## 2023-06-12 PROBLEM — G56.02 LEFT CARPAL TUNNEL SYNDROME: Status: ACTIVE | Noted: 2023-06-12

## 2023-06-20 ENCOUNTER — TELEPHONE (OUTPATIENT)
Age: 63
End: 2023-06-20

## 2023-06-20 DIAGNOSIS — S52.552D OTHER CLOSED EXTRA-ARTICULAR FRACTURE OF DISTAL END OF LEFT RADIUS WITH ROUTINE HEALING, SUBSEQUENT ENCOUNTER: ICD-10-CM

## 2023-06-20 DIAGNOSIS — G56.02 LEFT CARPAL TUNNEL SYNDROME: ICD-10-CM

## 2023-06-20 DIAGNOSIS — S62.022D: Primary | ICD-10-CM

## 2023-06-20 RX ORDER — OXYCODONE HYDROCHLORIDE AND ACETAMINOPHEN 5; 325 MG/1; MG/1
1 TABLET ORAL EVERY 8 HOURS PRN
Qty: 12 TABLET | Refills: 0 | Status: SHIPPED | OUTPATIENT
Start: 2023-06-20 | End: 2023-07-11

## 2023-06-20 NOTE — TELEPHONE ENCOUNTER
Patient called to request a refill of medication Oxycodone called in to [facility] on [address] in [facility]. He recently had sx on 06/13. Please advise the patient once it has been sent so he can head to the pharmacy. Patient can be reached at [phone].

## 2023-06-28 ENCOUNTER — TELEPHONE (OUTPATIENT)
Age: 63
End: 2023-06-28

## 2023-06-28 ENCOUNTER — OFFICE VISIT (OUTPATIENT)
Age: 63
End: 2023-06-28

## 2023-06-28 VITALS — WEIGHT: 174 LBS | HEIGHT: 68 IN | BODY MASS INDEX: 26.37 KG/M2 | TEMPERATURE: 97.5 F

## 2023-06-28 DIAGNOSIS — S52.552D OTHER CLOSED EXTRA-ARTICULAR FRACTURE OF DISTAL END OF LEFT RADIUS WITH ROUTINE HEALING, SUBSEQUENT ENCOUNTER: ICD-10-CM

## 2023-06-28 DIAGNOSIS — Z87.81 S/P ORIF (OPEN REDUCTION INTERNAL FIXATION) FRACTURE: Primary | ICD-10-CM

## 2023-06-28 DIAGNOSIS — S62.022D: ICD-10-CM

## 2023-06-28 DIAGNOSIS — Z98.890 S/P ORIF (OPEN REDUCTION INTERNAL FIXATION) FRACTURE: Primary | ICD-10-CM

## 2023-06-28 RX ORDER — HYDROCODONE BITARTRATE AND ACETAMINOPHEN 5; 325 MG/1; MG/1
1 TABLET ORAL EVERY 6 HOURS PRN
Qty: 20 TABLET | Refills: 0 | Status: SHIPPED | OUTPATIENT
Start: 2023-06-28 | End: 2023-07-03

## 2023-06-28 NOTE — TELEPHONE ENCOUNTER
Patient called again in regards to pain medication from [place]. Patient tel. [phone]. Note :Please see previous message.

## 2023-06-28 NOTE — TELEPHONE ENCOUNTER
Post op patient called and said he saw Yousuf Gibbs today for his hand. That Yousuf Gibbs told him he was going to send in some Pain Medication, but nothing was sent to his pharmacy. That he is at the pharmacy now. Patient is asking that the pain medication be sent . [address]  on [facility]. [phone]. Patient tel. [phone].

## 2023-06-28 NOTE — TELEPHONE ENCOUNTER
Post op patient has been called and is aware that the pain medication was sent to his pharmacy by [facility]. Patient said thank  you. Did not request a call back. Patient tel. [phone].

## 2023-08-09 ENCOUNTER — OFFICE VISIT (OUTPATIENT)
Age: 63
End: 2023-08-09

## 2023-08-09 VITALS — BODY MASS INDEX: 26.37 KG/M2 | WEIGHT: 174 LBS | HEIGHT: 68 IN

## 2023-08-09 DIAGNOSIS — S52.552D OTHER CLOSED EXTRA-ARTICULAR FRACTURE OF DISTAL END OF LEFT RADIUS WITH ROUTINE HEALING, SUBSEQUENT ENCOUNTER: ICD-10-CM

## 2023-08-09 DIAGNOSIS — Z87.81 S/P ORIF (OPEN REDUCTION INTERNAL FIXATION) FRACTURE: Primary | ICD-10-CM

## 2023-08-09 DIAGNOSIS — S62.022D: ICD-10-CM

## 2023-08-09 DIAGNOSIS — Z98.890 S/P ORIF (OPEN REDUCTION INTERNAL FIXATION) FRACTURE: Primary | ICD-10-CM

## 2023-08-09 PROCEDURE — 99024 POSTOP FOLLOW-UP VISIT: CPT | Performed by: ORTHOPAEDIC SURGERY

## 2023-08-09 RX ORDER — HYDROCODONE BITARTRATE AND ACETAMINOPHEN 5; 325 MG/1; MG/1
1 TABLET ORAL EVERY 6 HOURS PRN
Qty: 20 TABLET | Refills: 0 | Status: SHIPPED | OUTPATIENT
Start: 2023-08-09 | End: 2023-08-14

## 2023-08-17 ENCOUNTER — TELEPHONE (OUTPATIENT)
Age: 63
End: 2023-08-17

## 2023-08-17 NOTE — TELEPHONE ENCOUNTER
Instruct patient to consistently wear brace. Schedule him for 9:50 AM on 8/23/2023 at [facility].

## 2023-08-17 NOTE — TELEPHONE ENCOUNTER
Left generic VM for patient. Appointment scheduled but not confirmed with patient. If patient calls back please confirm appointment and per Dr Vina Hodgkins, instruct to wear brace consistently.

## 2023-08-17 NOTE — TELEPHONE ENCOUNTER
Patient called stating he woke up this morning and it felt like something shifted in his left hand. He started having pain and swelling and wants to know if this needs to be looked at or have an xray done. Please advise pt at [phone].

## 2023-08-18 ENCOUNTER — HOSPITAL ENCOUNTER (OUTPATIENT)
Facility: HOSPITAL | Age: 63
Setting detail: RECURRING SERIES
End: 2023-08-18
Payer: COMMERCIAL

## 2023-08-28 ENCOUNTER — HOSPITAL ENCOUNTER (OUTPATIENT)
Facility: HOSPITAL | Age: 63
Setting detail: RECURRING SERIES
Discharge: HOME | End: 2023-08-31
Payer: COMMERCIAL

## 2023-08-28 PROCEDURE — 97166 OT EVAL MOD COMPLEX 45 MIN: CPT

## 2023-08-28 PROCEDURE — 97530 THERAPEUTIC ACTIVITIES: CPT

## 2023-08-28 PROCEDURE — 97535 SELF CARE MNGMENT TRAINING: CPT

## 2023-08-28 NOTE — PROGRESS NOTES
OCCUPATIONAL THERAPY - DAILY TREATMENT NOTE    Patient Name: Abril Sapp    Date: 2023    : 1960  Insurance: Payor: [place] / Plan: [place] / Product Type: *No Product type* /      Patient  verified Yes     Visit #   Current / Total 1 18   Time   In / Out 235 3:19   Total Treatment Time 44   Pain   In / Out 4 6   Subjective Functional Status/Changes: See POC     TREATMENT AREA =  Left wrist pain [M25.532]    OBJECTIVE    Eval - 28 mins    Therapeutic Procedures:   Tx Min Billable or 1:1 Min (if diff from Tx Min) Procedure, Rationale, Specifics   8  69972 Therapeutic Activity (timed):  use of dynamic activities replicating functional movements to increase ability to complete ADLs/IADLs independently (see flow sheet as applicable)    HEP wrist mobility: AROM flexion, extension, radial deviation, ulnar deviation, prayer stretches, towel scrunches  HEP median nerve glides     8  91269 Self Care/Home Management (timed):  improve patient knowledge and understanding of pain reducing techniques, positioning, diagnosis/prognosis, and physical therapy expectations, procedures and progression  to increase ability to complete ADLs/IADLs independently  (see flow sheet as applicable)    Education on edema control  Education on scar massage with mini massager and dycem  Education on anatomy  Education on dx and prognosis                    16 16 [facility] Totals Reminder: bill using total billable min of TIMED therapeutic procedures (example: do not include dry needle or estim unattended, both untimed codes, in totals to left)  8-22 min = 1 unit; 23-37 min = 2 units; 38-52 min = 3 units; 53-67 min = 4 units; 68-82 min = 5 units   Total Total         Billed concurrently with other treatments Patient Education:  Reviewed HEP     Objective Information/Functional Measures/Assessment    See POC           []  See Progress Note/Re certification     Patient will continue to benefit from skilled OT services to
Patient to be seen 1-3 times per week for 4-6 weeks    Patient/ Caregiver education and instruction: Diagnosis, prognosis, self care and exercises [x]  Plan of care has been reviewed with [facility]    Certification Period: 8/28/23-TARYN Campo OT       8/28/2023       7:53 AM  ===================================================================  I certify that the above Therapy Services are being furnished while the patient is under my care. I agree with the treatment plan and certify that this therapy is necessary. Physician's Signature:_________________________   DATE:_________   TIME:________                           Carlos Hill DO    ** Signature, Date and Time must be completed for valid certification **  [facility] or you may fax the signed copy to [phone]. Thank you.

## 2023-09-13 ENCOUNTER — TELEPHONE (OUTPATIENT)
Facility: HOSPITAL | Age: 63
End: 2023-09-13

## 2023-09-13 NOTE — TELEPHONE ENCOUNTER
Called pt to inform of no show and need to reschedule/ schedule more follow up appointments. Reminded pt of NS/CX policy. Told pt to call  tomorrow to schedule d/t current time. Pt reported he will call tomorrow to schedule. Pt provided with main phone number.

## 2023-09-19 ENCOUNTER — HOSPITAL ENCOUNTER (OUTPATIENT)
Facility: HOSPITAL | Age: 63
Setting detail: RECURRING SERIES
Discharge: HOME | End: 2023-09-22
Payer: COMMERCIAL

## 2023-09-19 PROCEDURE — 97112 NEUROMUSCULAR REEDUCATION: CPT

## 2023-09-19 PROCEDURE — 97530 THERAPEUTIC ACTIVITIES: CPT

## 2023-09-19 PROCEDURE — 97110 THERAPEUTIC EXERCISES: CPT

## 2023-09-19 PROCEDURE — 97018 PARAFFIN BATH THERAPY: CPT

## 2023-09-19 NOTE — PROGRESS NOTES
PHYSICAL / OCCUPATIONAL THERAPY - DAILY TREATMENT NOTE (updated )    Patient Name: Elizabet Jha    Date: 2023    : 1960  Insurance: Payor: [employer] / Plan: [place] / Product Type: *No Product type* /      Patient  verified Yes     Visit #   Current / Total 2 12   Time   In / Out 600 650   Pain   In / Out 7 3/10   Subjective Functional Status/Changes: Pt reports \"It really hurts fucking bad man work sucked today\"    Changes to: Allergies, Med Hx, Sx Hx?   no       TREATMENT AREA =  Left wrist pain [M25.532]    OBJECTIVE    Paraffin wax: 8 min to left hand, goal to decrease pain, improve soft tissue mobilzation prior to activities. Skin intact following administration     Therapeutic Procedures: Tx Min Billable or 1:1 Min (if diff from Tx Min) Procedure, Rationale, Specifics   15  27407 Therapeutic Exercise (timed):  increase ROM, strength, coordination, balance, and proprioception to improve patient's ability to progress to PLOF and address remaining functional goals. (see flow sheet as applicable)    Details if applicable:       15  98915 Therapeutic Activity (timed):  use of dynamic activities replicating functional movements to increase ROM, strength, coordination, balance, and proprioception in order to improve patient's ability to progress to PLOF and address remaining functional goals. (see flow sheet as applicable)    Details if applicable:     8  74641 Neuromuscular Re-Education (timed):  improve balance, coordination, kinesthetic sense, posture, core stability and proprioception to improve patient's ability to develop conscious control of individual muscles and awareness of position of extremities in order to progress to PLOF and address remaining functional goals.  (see flow sheet as applicable)     Details if applicable:               55  [facility] Totals Reminder: bill using total billable min of TIMED therapeutic procedures (example: do not include dry needle or estim

## 2023-09-27 ENCOUNTER — HOSPITAL ENCOUNTER (OUTPATIENT)
Facility: HOSPITAL | Age: 63
Setting detail: RECURRING SERIES
End: 2023-09-27
Payer: COMMERCIAL

## 2023-10-04 ENCOUNTER — OFFICE VISIT (OUTPATIENT)
Age: 63
End: 2023-10-04

## 2023-10-04 VITALS — HEIGHT: 68 IN | BODY MASS INDEX: 26.83 KG/M2 | WEIGHT: 177 LBS | TEMPERATURE: 97 F

## 2023-10-04 DIAGNOSIS — S62.022D: ICD-10-CM

## 2023-10-04 DIAGNOSIS — Z98.890 S/P ORIF (OPEN REDUCTION INTERNAL FIXATION) FRACTURE: ICD-10-CM

## 2023-10-04 DIAGNOSIS — Z87.81 S/P ORIF (OPEN REDUCTION INTERNAL FIXATION) FRACTURE: ICD-10-CM

## 2023-10-04 DIAGNOSIS — S52.552D OTHER CLOSED EXTRA-ARTICULAR FRACTURE OF DISTAL END OF LEFT RADIUS WITH ROUTINE HEALING, SUBSEQUENT ENCOUNTER: Primary | ICD-10-CM

## 2023-10-04 NOTE — PROGRESS NOTES
Patrice Hall is a 66 y.o. male right handed employed. Worker's Compensation and legal considerations: none    Chief Complaint   Patient presents with    Follow-up     Left wrist     Pain Score:   5    HPI: Patient presents today approximately 4 months status post left distal radius and left scaphoid open reduction internal fixation and left carpal tunnel release. He does report improvement with therapy. He also reports that he has been drinking today. 8/9/2023 HPI: Patient presents today approximately 2 months status post left distal radius and left scaphoid open reduction internal fixation as well as left carpal tunnel release. He reports to be doing better with range of motion of his fingers but still having some pain in the wrist.  He has been wearing his wrist brace. Initial HPI: Patient presents today with a history of fall onto his left wrist.  He was diagnosed with a distal radius fracture as well as radiographic findings by radiologist consistent with a scaphoid fracture. Date of onset: 5/13/2023  Injury: Yes: Comment: Fall  Prior Treatment:  left distal radius and left scaphoid open reduction internal fixation as well as left carpal tunnel release. ROS: Review of Systems - General ROS: negative except HPI    History reviewed. No pertinent past medical history.     Past Surgical History:   Procedure Laterality Date    CARPAL TUNNEL RELEASE Left 6/13/2023    LEFT CARPAL TUNNEL RELEASE performed by Roc Jackman DO at [address] [facility] Left 6/13/2023    LEFT SCAPHOID OPEN REDUCTION INTERNAL FIXATION, LEFT DISTAL RADIUS OPEN REDUCTION INTERNAL FIXATION; MINI C-ARM; ACUMED, SUPRACLAVICULAR BLOCK performed by Roc Jackman DO at [address]          Current Outpatient Medications   Medication Sig Dispense Refill    diclofenac (VOLTAREN) 50 MG EC tablet Take 1 tablet by mouth 3 times daily (with meals) for 10 days 30 tablet 0     No current

## 2023-10-05 ENCOUNTER — HOSPITAL ENCOUNTER (OUTPATIENT)
Facility: HOSPITAL | Age: 63
Setting detail: RECURRING SERIES
Discharge: HOME | End: 2023-10-08
Payer: COMMERCIAL

## 2023-10-05 PROCEDURE — 97022 WHIRLPOOL THERAPY: CPT

## 2023-10-05 PROCEDURE — 97110 THERAPEUTIC EXERCISES: CPT

## 2023-10-10 ENCOUNTER — HOSPITAL ENCOUNTER (OUTPATIENT)
Facility: HOSPITAL | Age: 63
Setting detail: RECURRING SERIES
End: 2023-10-10
Payer: COMMERCIAL

## 2023-11-01 ENCOUNTER — TELEPHONE (OUTPATIENT)
Facility: HOSPITAL | Age: 63
End: 2023-11-01

## 2023-11-01 NOTE — TELEPHONE ENCOUNTER
Spoke with patient re: multiple missed visits . Patient aware of discharge and voiced understanding of clinic policy.  Pt advised of protocol to return to therapy